# Patient Record
Sex: MALE | Race: BLACK OR AFRICAN AMERICAN | Employment: FULL TIME | URBAN - METROPOLITAN AREA
[De-identification: names, ages, dates, MRNs, and addresses within clinical notes are randomized per-mention and may not be internally consistent; named-entity substitution may affect disease eponyms.]

---

## 2019-11-08 ENCOUNTER — HOSPITAL ENCOUNTER (EMERGENCY)
Facility: HOSPITAL | Age: 39
Discharge: HOME | End: 2019-11-09
Attending: EMERGENCY MEDICINE
Payer: MEDICAID

## 2019-11-08 ENCOUNTER — HOSPITAL ENCOUNTER (EMERGENCY)
Facility: HOSPITAL | Age: 39
Discharge: HOME | End: 2019-11-08
Attending: EMERGENCY MEDICINE
Payer: MEDICAID

## 2019-11-08 ENCOUNTER — APPOINTMENT (OUTPATIENT)
Dept: GENERAL RADIOLOGY | Facility: HOSPITAL | Age: 39
End: 2019-11-08
Attending: EMERGENCY MEDICINE
Payer: MEDICAID

## 2019-11-08 VITALS
OXYGEN SATURATION: 100 % | RESPIRATION RATE: 16 BRPM | WEIGHT: 134 LBS | HEART RATE: 78 BPM | BODY MASS INDEX: 21.53 KG/M2 | SYSTOLIC BLOOD PRESSURE: 128 MMHG | HEIGHT: 66 IN | DIASTOLIC BLOOD PRESSURE: 73 MMHG | TEMPERATURE: 99 F

## 2019-11-08 DIAGNOSIS — Z79.01 SUBTHERAPEUTIC ANTICOAGULATION: ICD-10-CM

## 2019-11-08 DIAGNOSIS — R07.9 CHEST PAIN OF UNCERTAIN ETIOLOGY: Primary | ICD-10-CM

## 2019-11-08 DIAGNOSIS — R06.02 SHORTNESS OF BREATH: Primary | ICD-10-CM

## 2019-11-08 DIAGNOSIS — Z51.81 SUBTHERAPEUTIC ANTICOAGULATION: ICD-10-CM

## 2019-11-08 PROCEDURE — 93005 ELECTROCARDIOGRAM TRACING: CPT

## 2019-11-08 PROCEDURE — 99285 EMERGENCY DEPT VISIT HI MDM: CPT

## 2019-11-08 PROCEDURE — 71045 X-RAY EXAM CHEST 1 VIEW: CPT | Performed by: EMERGENCY MEDICINE

## 2019-11-08 PROCEDURE — 85025 COMPLETE CBC W/AUTO DIFF WBC: CPT | Performed by: EMERGENCY MEDICINE

## 2019-11-08 PROCEDURE — 36415 COLL VENOUS BLD VENIPUNCTURE: CPT

## 2019-11-08 PROCEDURE — 84484 ASSAY OF TROPONIN QUANT: CPT | Performed by: EMERGENCY MEDICINE

## 2019-11-08 PROCEDURE — 80048 BASIC METABOLIC PNL TOTAL CA: CPT | Performed by: EMERGENCY MEDICINE

## 2019-11-08 PROCEDURE — 93010 ELECTROCARDIOGRAM REPORT: CPT | Performed by: EMERGENCY MEDICINE

## 2019-11-08 PROCEDURE — 85610 PROTHROMBIN TIME: CPT | Performed by: EMERGENCY MEDICINE

## 2019-11-08 PROCEDURE — 99284 EMERGENCY DEPT VISIT MOD MDM: CPT

## 2019-11-08 RX ORDER — WARFARIN SODIUM 10 MG/1
10 TABLET ORAL DAILY
Status: ON HOLD | COMMUNITY
End: 2019-12-21

## 2019-11-08 RX ORDER — LEVETIRACETAM 500 MG/1
500 TABLET ORAL 2 TIMES DAILY
COMMUNITY

## 2019-11-08 NOTE — ED INITIAL ASSESSMENT (HPI)
Sob while at [employer]. Now states hes having cp. Wants to be admitted and brought all belongings with him.  Vitals wnl no distress noted

## 2019-11-09 VITALS
RESPIRATION RATE: 18 BRPM | DIASTOLIC BLOOD PRESSURE: 65 MMHG | HEART RATE: 74 BPM | SYSTOLIC BLOOD PRESSURE: 121 MMHG | OXYGEN SATURATION: 100 % | TEMPERATURE: 98 F | WEIGHT: 134.06 LBS | BODY MASS INDEX: 22 KG/M2

## 2019-11-09 NOTE — ED PROVIDER NOTES
Patient Seen in: [facility] AND [facility] Emergency Department    History   Patient presents with:  Dyspnea QUIN SOB (respiratory)      HPI    Patient presents to the ED after developing shortness of breath while at [facility].   He presents to the ED for this re Pulmonary/Chest: Effort normal. No stridor. No respiratory distress. He has no wheezes. Old sternotomy scar   Abdominal: Soft. He exhibits no distension. Musculoskeletal:         General: No deformity or edema.      Neurological: He is alert and oriente discharge summaries, testing, and procedures and reviewed those reports. Complicating Factors: The patient already has does not have a problem list on file. to contribute to the complexity of this ED evaluation.     ED Course: Patient presents complainin

## 2019-11-09 NOTE — CM/SW NOTE
Called by Dr. Sharon Tyler to arrange discharge transportation for patient as this is pt's second visit of the evening. Spoke with patient, offered homeless resources and patient declined stating he is staying with his cousin @ [address] [place] [address]. in [place].   CANDIDO

## 2019-11-09 NOTE — ED PROVIDER NOTES
Patient Seen in: [facility] AND [facility] Emergency Department    History   Patient presents with:  Chest Pain      HPI    Patient returns to the ED now stating that he has chest pain.   Was just seen earlier for shortness of breath and discharged home after req Normal rate and intact distal pulses. Exam reveals no friction rub. Pulmonary/Chest: Effort normal. No stridor. No respiratory distress. He has no wheezes. He has no rales. Old sternotomy scar   Abdominal: Soft. He exhibits no distension.  Musculoskelet (CST): Molly Simons MD on 11/08/2019 at 21:24     Approved by (CST): Molly Simons MD on 11/08/2019 at 21:25          ED Medications Administered: Medications - No data to display      [facility]      11/08/19  3169   BP: 128/74   Pulse: 76   Resp: 20 diagnosis)  Subtherapeutic anticoagulation    Disposition:  Discharge    Follow-up:  Carlton Osman MD  [address]  [phone]    Schedule an appointment as soon as possible for a visit in 3 days

## 2019-11-09 NOTE — ED INITIAL ASSESSMENT (HPI)
Pt recently dc'd from ER for CP. He states that all the tests came back good but he is still having CP and wants to be admitted.

## 2019-11-09 NOTE — ED NOTES
Pt states while sitting at [employer] he developed SOB and L sternum CP. Pt denies feeling SOB at this time, but reports sharp CP.

## 2019-11-16 ENCOUNTER — HOSPITAL ENCOUNTER (EMERGENCY)
Facility: HOSPITAL | Age: 39
Discharge: HOME | End: 2019-11-16
Attending: EMERGENCY MEDICINE
Payer: MEDICAID

## 2019-11-16 ENCOUNTER — APPOINTMENT (OUTPATIENT)
Dept: GENERAL RADIOLOGY | Facility: HOSPITAL | Age: 39
End: 2019-11-16
Attending: EMERGENCY MEDICINE
Payer: MEDICAID

## 2019-11-16 VITALS
WEIGHT: 135 LBS | TEMPERATURE: 98 F | SYSTOLIC BLOOD PRESSURE: 108 MMHG | RESPIRATION RATE: 16 BRPM | DIASTOLIC BLOOD PRESSURE: 59 MMHG | OXYGEN SATURATION: 97 % | BODY MASS INDEX: 22.49 KG/M2 | HEART RATE: 84 BPM | HEIGHT: 65 IN

## 2019-11-16 DIAGNOSIS — R00.2 PALPITATIONS: Primary | ICD-10-CM

## 2019-11-16 PROCEDURE — 84484 ASSAY OF TROPONIN QUANT: CPT | Performed by: EMERGENCY MEDICINE

## 2019-11-16 PROCEDURE — 80048 BASIC METABOLIC PNL TOTAL CA: CPT | Performed by: EMERGENCY MEDICINE

## 2019-11-16 PROCEDURE — 93010 ELECTROCARDIOGRAM REPORT: CPT | Performed by: EMERGENCY MEDICINE

## 2019-11-16 PROCEDURE — 71045 X-RAY EXAM CHEST 1 VIEW: CPT | Performed by: EMERGENCY MEDICINE

## 2019-11-16 PROCEDURE — 36415 COLL VENOUS BLD VENIPUNCTURE: CPT

## 2019-11-16 PROCEDURE — 99284 EMERGENCY DEPT VISIT MOD MDM: CPT

## 2019-11-16 PROCEDURE — 93005 ELECTROCARDIOGRAM TRACING: CPT

## 2019-11-16 PROCEDURE — 85025 COMPLETE CBC W/AUTO DIFF WBC: CPT | Performed by: EMERGENCY MEDICINE

## 2019-11-17 NOTE — CM/SW NOTE
Cm met with patient at bedside    Patient states \"I do not have any money to stay at the [employer].  I get paid next week\"    Cm gave resources   Judah Squires   LIBIA [facility], [address] [facility] in [address]

## 2019-11-17 NOTE — ED INITIAL ASSESSMENT (HPI)
Pt states he felt like he was about to have a seizure. Pt was shaking, states that happened with with his previous seizure. Last seizure august. Pt states he also felt like his ruiz was racing. Hx of a-fib. Pt also c/o chest pain starting few min pta.

## 2019-11-17 NOTE — ED PROVIDER NOTES
Patient Seen in: [facility] Emergency Department    History   Patient presents with:  Arrythmia/Palpitations (cardiovascular)      HPI    Patient returns to the ED stating that he felt like he was about to have a seizure tonight while sitting at [facility] normal. Right eye exhibits no discharge. Left eye exhibits no discharge. Neck: No tracheal deviation present. Cardiovascular: Normal rate and intact distal pulses. Audible mechanical heart sound   Pulmonary/Chest: Effort normal. No stridor.  No respir unchanged. Small right pleural effusion, unchanged.     Dictated by (CST): Panchito Duncan MD on 11/16/2019 at 19:58     Approved by (CST): Panchito Duncan MD on 11/16/2019 at 20:00          ED Medications Administered: Medications - No data to display      MDM Medication List as of 11/16/2019  8:39 PM

## 2019-11-17 NOTE — ED NOTES
Patient states he was at [facility] when he started to have palpations. Patient states \" I want to be admitted\" . No respiratory distress noted. Hx of AFIB.

## 2019-11-23 ENCOUNTER — APPOINTMENT (OUTPATIENT)
Dept: GENERAL RADIOLOGY | Facility: HOSPITAL | Age: 39
End: 2019-11-23
Attending: EMERGENCY MEDICINE

## 2019-11-23 ENCOUNTER — APPOINTMENT (OUTPATIENT)
Dept: CT IMAGING | Facility: HOSPITAL | Age: 39
End: 2019-11-23
Attending: EMERGENCY MEDICINE

## 2019-11-23 ENCOUNTER — HOSPITAL ENCOUNTER (EMERGENCY)
Facility: HOSPITAL | Age: 39
Discharge: HOME | End: 2019-11-23
Attending: EMERGENCY MEDICINE

## 2019-11-23 VITALS
DIASTOLIC BLOOD PRESSURE: 64 MMHG | TEMPERATURE: 98 F | OXYGEN SATURATION: 98 % | RESPIRATION RATE: 15 BRPM | HEART RATE: 78 BPM | SYSTOLIC BLOOD PRESSURE: 121 MMHG

## 2019-11-23 DIAGNOSIS — I48.91 ATRIAL FIBRILLATION WITH CONTROLLED VENTRICULAR RESPONSE (HCC): ICD-10-CM

## 2019-11-23 DIAGNOSIS — R06.00 DYSPNEA, UNSPECIFIED TYPE: Primary | ICD-10-CM

## 2019-11-23 DIAGNOSIS — I51.7 CARDIOMEGALY: ICD-10-CM

## 2019-11-23 PROCEDURE — 93010 ELECTROCARDIOGRAM REPORT: CPT | Performed by: EMERGENCY MEDICINE

## 2019-11-23 PROCEDURE — 85025 COMPLETE CBC W/AUTO DIFF WBC: CPT | Performed by: EMERGENCY MEDICINE

## 2019-11-23 PROCEDURE — 84484 ASSAY OF TROPONIN QUANT: CPT | Performed by: EMERGENCY MEDICINE

## 2019-11-23 PROCEDURE — 36415 COLL VENOUS BLD VENIPUNCTURE: CPT

## 2019-11-23 PROCEDURE — 71046 X-RAY EXAM CHEST 2 VIEWS: CPT | Performed by: EMERGENCY MEDICINE

## 2019-11-23 PROCEDURE — 71260 CT THORAX DX C+: CPT | Performed by: EMERGENCY MEDICINE

## 2019-11-23 PROCEDURE — 85379 FIBRIN DEGRADATION QUANT: CPT | Performed by: EMERGENCY MEDICINE

## 2019-11-23 PROCEDURE — 99285 EMERGENCY DEPT VISIT HI MDM: CPT

## 2019-11-23 PROCEDURE — 93005 ELECTROCARDIOGRAM TRACING: CPT

## 2019-11-23 PROCEDURE — 80048 BASIC METABOLIC PNL TOTAL CA: CPT | Performed by: EMERGENCY MEDICINE

## 2019-11-23 PROCEDURE — 85610 PROTHROMBIN TIME: CPT | Performed by: EMERGENCY MEDICINE

## 2019-11-23 RX ORDER — WARFARIN SODIUM 7.5 MG/1
7.5 TABLET ORAL NIGHTLY
Qty: 14 TABLET | Refills: 0 | Status: SHIPPED | OUTPATIENT
Start: 2019-11-23 | End: 2019-12-07

## 2019-11-23 NOTE — ED INITIAL ASSESSMENT (HPI)
Patient here for SOB over the last month. States he has had several episodes of chest pain that resolves on its own. About 30 minutes hours ago, he developed substernal CP without radiation.  Patient has been worked up for this several times in the past. Pa

## 2019-11-23 NOTE — CM/SW NOTE
Patient requesting a script for Coumadin, patient states he will not follow up with our Cards because he does not have any money upfront. [address] [facility] info will also be given for follow up- free to [facility].      Lea

## 2019-11-23 NOTE — ED NOTES
Pt BIBEMS d/t SOB and L sided dull, nonradiating, 9/10 cp beginning 10 min prior to calling paramedics. States has been intermittent x1-2 months. Denies n/v/d/f/c, no recent illiness, dizziness.   Has been seen multiple times for same issue in past.   PMH x

## 2019-11-23 NOTE — CM/SW NOTE
Met with patient regarding his Medicaid from [place]. [place] and works [employer] 5 to 6 days a week. Pt. Reports he has a cousin that lives here as well. He works at a [employer] and stays at [employer] in [place].

## 2019-11-23 NOTE — ED PROVIDER NOTES
Patient Seen in: [facility] AND [facility] Emergency Department    History   Patient presents with:  Dyspnea QUIN SOB (respiratory)    Stated Complaint: SOB    HPI    Patient presents again for difficulty breathing.   He said the symptoms on and off for over 2 mon pain      Positive for stated complaint: SOB  Other systems are as noted in HPI. Constitutional and vital signs reviewed. All other systems reviewed and negative except as noted above.       Physical Exam     ED Triage Vitals [11/23/19 1109]   / Coumadin. He states he is does not have that he initially stated that they had filled the prescription but now he says he does not have the medication.   We will give him a short course is listed as 10 mg a day which seems like a high dose we will do 7.5 w The following orders were created for panel order CBC WITH DIFFERENTIAL WITH PLATELET.   Procedure                               Abnormality         Status                     ---------                               -----------         ------

## 2019-12-20 ENCOUNTER — APPOINTMENT (OUTPATIENT)
Dept: CT IMAGING | Facility: HOSPITAL | Age: 39
End: 2019-12-20
Attending: EMERGENCY MEDICINE
Payer: MEDICAID

## 2019-12-20 ENCOUNTER — HOSPITAL ENCOUNTER (OUTPATIENT)
Facility: HOSPITAL | Age: 39
Setting detail: OBSERVATION
Discharge: HOME | End: 2019-12-21
Attending: EMERGENCY MEDICINE | Admitting: INTERNAL MEDICINE
Payer: MEDICAID

## 2019-12-20 ENCOUNTER — APPOINTMENT (OUTPATIENT)
Dept: GENERAL RADIOLOGY | Facility: HOSPITAL | Age: 39
End: 2019-12-20
Attending: EMERGENCY MEDICINE
Payer: MEDICAID

## 2019-12-20 DIAGNOSIS — R07.9 CHEST PAIN OF UNCERTAIN ETIOLOGY: Primary | ICD-10-CM

## 2019-12-20 DIAGNOSIS — R06.00 DYSPNEA, UNSPECIFIED TYPE: ICD-10-CM

## 2019-12-20 PROBLEM — R79.89 AZOTEMIA: Status: ACTIVE | Noted: 2019-12-20

## 2019-12-20 PROBLEM — D69.6 THROMBOCYTOPENIA (HCC): Status: ACTIVE | Noted: 2019-12-20

## 2019-12-20 PROBLEM — D64.9 ANEMIA: Status: ACTIVE | Noted: 2019-12-20

## 2019-12-20 PROCEDURE — 85610 PROTHROMBIN TIME: CPT | Performed by: EMERGENCY MEDICINE

## 2019-12-20 PROCEDURE — 85025 COMPLETE CBC W/AUTO DIFF WBC: CPT | Performed by: EMERGENCY MEDICINE

## 2019-12-20 PROCEDURE — 71260 CT THORAX DX C+: CPT | Performed by: EMERGENCY MEDICINE

## 2019-12-20 PROCEDURE — 83880 ASSAY OF NATRIURETIC PEPTIDE: CPT | Performed by: EMERGENCY MEDICINE

## 2019-12-20 PROCEDURE — 80048 BASIC METABOLIC PNL TOTAL CA: CPT | Performed by: EMERGENCY MEDICINE

## 2019-12-20 PROCEDURE — 96372 THER/PROPH/DIAG INJ SC/IM: CPT

## 2019-12-20 PROCEDURE — 85730 THROMBOPLASTIN TIME PARTIAL: CPT | Performed by: EMERGENCY MEDICINE

## 2019-12-20 PROCEDURE — 93010 ELECTROCARDIOGRAM REPORT: CPT | Performed by: EMERGENCY MEDICINE

## 2019-12-20 PROCEDURE — 93005 ELECTROCARDIOGRAM TRACING: CPT

## 2019-12-20 PROCEDURE — 71046 X-RAY EXAM CHEST 2 VIEWS: CPT | Performed by: EMERGENCY MEDICINE

## 2019-12-20 PROCEDURE — 84484 ASSAY OF TROPONIN QUANT: CPT | Performed by: EMERGENCY MEDICINE

## 2019-12-20 PROCEDURE — 99285 EMERGENCY DEPT VISIT HI MDM: CPT

## 2019-12-20 PROCEDURE — 36415 COLL VENOUS BLD VENIPUNCTURE: CPT

## 2019-12-20 RX ORDER — ENOXAPARIN SODIUM 100 MG/ML
60 INJECTION SUBCUTANEOUS ONCE
Status: COMPLETED | OUTPATIENT
Start: 2019-12-20 | End: 2019-12-20

## 2019-12-21 VITALS
HEART RATE: 71 BPM | DIASTOLIC BLOOD PRESSURE: 72 MMHG | WEIGHT: 134.69 LBS | OXYGEN SATURATION: 100 % | HEIGHT: 66 IN | RESPIRATION RATE: 18 BRPM | BODY MASS INDEX: 21.64 KG/M2 | SYSTOLIC BLOOD PRESSURE: 124 MMHG | TEMPERATURE: 98 F

## 2019-12-21 PROCEDURE — 85025 COMPLETE CBC W/AUTO DIFF WBC: CPT | Performed by: INTERNAL MEDICINE

## 2019-12-21 PROCEDURE — 85610 PROTHROMBIN TIME: CPT | Performed by: INTERNAL MEDICINE

## 2019-12-21 PROCEDURE — 80048 BASIC METABOLIC PNL TOTAL CA: CPT | Performed by: INTERNAL MEDICINE

## 2019-12-21 RX ORDER — WARFARIN SODIUM 2 MG/1
2 TABLET ORAL ONCE
Status: COMPLETED | OUTPATIENT
Start: 2019-12-21 | End: 2019-12-21

## 2019-12-21 RX ORDER — METOPROLOL SUCCINATE 25 MG/1
25 TABLET, EXTENDED RELEASE ORAL DAILY
COMMUNITY
Start: 2019-11-26 | End: 2019-12-26

## 2019-12-21 RX ORDER — WARFARIN SODIUM 10 MG/1
10 TABLET ORAL DAILY
Status: DISCONTINUED | OUTPATIENT
Start: 2019-12-21 | End: 2019-12-21

## 2019-12-21 RX ORDER — LEVETIRACETAM 500 MG/1
500 TABLET ORAL 2 TIMES DAILY
Status: DISCONTINUED | OUTPATIENT
Start: 2019-12-21 | End: 2019-12-21

## 2019-12-21 RX ORDER — ENOXAPARIN SODIUM 100 MG/ML
60 INJECTION SUBCUTANEOUS EVERY 12 HOURS SCHEDULED
Qty: 7 VIAL | Refills: 0 | Status: ON HOLD | OUTPATIENT
Start: 2019-12-21 | End: 2020-09-27

## 2019-12-21 RX ORDER — LISINOPRIL 5 MG/1
5 TABLET ORAL DAILY
COMMUNITY
Start: 2019-11-25

## 2019-12-21 RX ORDER — ENOXAPARIN SODIUM 100 MG/ML
60 INJECTION SUBCUTANEOUS EVERY 12 HOURS SCHEDULED
Status: DISCONTINUED | OUTPATIENT
Start: 2019-12-21 | End: 2019-12-21

## 2019-12-21 RX ORDER — WARFARIN SODIUM 6 MG/1
12 TABLET ORAL DAILY
Qty: 30 TABLET | Refills: 0 | Status: ON HOLD | OUTPATIENT
Start: 2019-12-22 | End: 2020-11-30

## 2019-12-21 RX ORDER — LISINOPRIL 5 MG/1
5 TABLET ORAL DAILY
Status: DISCONTINUED | OUTPATIENT
Start: 2019-12-21 | End: 2019-12-21

## 2019-12-21 RX ORDER — MORPHINE SULFATE 2 MG/ML
2 INJECTION, SOLUTION INTRAMUSCULAR; INTRAVENOUS EVERY 4 HOURS PRN
Status: DISCONTINUED | OUTPATIENT
Start: 2019-12-21 | End: 2019-12-21

## 2019-12-21 RX ORDER — ACETAMINOPHEN 325 MG/1
650 TABLET ORAL EVERY 6 HOURS PRN
Status: DISCONTINUED | OUTPATIENT
Start: 2019-12-21 | End: 2019-12-21

## 2019-12-21 RX ORDER — METOPROLOL SUCCINATE 25 MG/1
25 TABLET, EXTENDED RELEASE ORAL DAILY
Status: DISCONTINUED | OUTPATIENT
Start: 2019-12-21 | End: 2019-12-21

## 2019-12-21 NOTE — PLAN OF CARE
Patient c/o left sided chest pain 5/10 that he stated was improved since ER where he rated it as 8/10. MD was notified and morphine was ordered, however  the patient was sleep before morphine could be offered and slept well for the duration of the night.  P hemodynamic stability  - Monitor arterial and/or venous puncture sites for bleeding and/or hematoma  - Assess quality of pulses, skin color and temperature  - Assess for signs of decreased coronary artery perfusion - ex.  Angina  - Evaluate fluid balance, a

## 2019-12-21 NOTE — ED INITIAL ASSESSMENT (HPI)
Pt arrived per EMS from home. States pt c/o sob and lt side chest pain. EMS states O2 sat 100% and sitting helps with pain.

## 2019-12-21 NOTE — ED PROVIDER NOTES
Patient Seen in: [facility] AND [facility] Emergency Department      History   Patient presents with:  Dyspnea QUIN SOB  Chest Pain Angina    Stated Complaint: sob, Lt chest pain    HPI    Patient presents the emergency department today complaining of shortness o Normal range of motion and neck supple. Cardiovascular:      Rate and Rhythm: Normal rate and regular rhythm. Heart sounds: No murmur. Pulmonary:      Effort: Pulmonary effort is normal. No accessory muscle usage or respiratory distress.       [employer] DIFFERENTIAL - Abnormal; Notable for the following components:    WBC 3.8 (*)     RBC 3.54 (*)     HGB 10.9 (*)     HCT 35.5 (*)     .3 (*)     MCHC 30.7 (*)     .0 (*)     All other components within normal limits   TROPONIN I - Normal   TRO Rasta Diaz. Will admit.            Disposition and Plan     Clinical Impression:  Chest pain of uncertain etiology  (primary encounter diagnosis)  Dyspnea, unspecified type    Disposition:  Admit  12/20/2019 10:46 pm    Follow-up:   [facility]  1

## 2019-12-21 NOTE — DISCHARGE SUMMARY
[facility]    Discharge Summary    Lucita Michel Patient Status:  Observation    10/13/1980 MRN [id number]   Location [address] [facility] Attending No att. providers found   [facility] Day # 0 PCP None Pcp     Date of Admission:  None    Complications: None    Discharge Condition: Stable         Discharge Medications:      Discharge Medications      START taking these medications      Instructions Prescription details   Enoxaparin Sodium 60 MG/0.6ML Soln  Commonly known as:  Nae Shell

## 2019-12-21 NOTE — CM/SW NOTE
Lovenox [employer] coupon printed and tubed up to floor for discharge. Belen approved for discharge.

## 2019-12-21 NOTE — CONSULTS
[facility]    PATIENT'S NAME: FERNY Herring   ATTENDING PHYSICIAN: Cam Mckeon MD   CONSULTING PHYSICIAN: Saji Hansen DO   PATIENT ACCOUNT#:   [de-identified]    LOCATION:  [address] RECORD #:   [id number]       DATE OF JACQUES ruled out. 2.   Chronic atrial fibrillation. 3.   History of mechanical mitral and tricuspid valve for unclear reason. 4.   Subtherapeutic INR. RECOMMENDATIONS:  Warfarin was increased to 12 mg daily.   Other medications can be continued which are me

## 2019-12-21 NOTE — H&P
Lois Elkins 60 Patient Status:  Observation    10/13/1980 MRN [id number]   Location [facility] Attending Alfred Dwyer, *   Hosp Day # 0 PCP None Pcp     Date:  2019  Date of 124/72, pulse 71, temperature 98.4 °F (36.9 °C), temperature source Oral, resp. rate 18, height 5' 6\" (1.676 m), weight 134 lb 11.2 oz (61.1 kg), SpO2 100 %.      General appearance: alert, appears stated age and cooperative  Pulmonary:  clear to auscultat unchanged. Followup chest CT recommended in 6 months to demonstrate resolution. Borderline enlarged left axillary and right lower paratracheal lymph nodes are unchanged. This can also be assessed for resolution on subsequent follow-up imaging.    Bronchia

## 2019-12-22 ENCOUNTER — HOSPITAL ENCOUNTER (EMERGENCY)
Facility: HOSPITAL | Age: 39
Discharge: HOME | End: 2019-12-22
Attending: EMERGENCY MEDICINE
Payer: MEDICAID

## 2019-12-22 VITALS
RESPIRATION RATE: 17 BRPM | BODY MASS INDEX: 22 KG/M2 | DIASTOLIC BLOOD PRESSURE: 75 MMHG | WEIGHT: 134 LBS | OXYGEN SATURATION: 98 % | SYSTOLIC BLOOD PRESSURE: 109 MMHG | HEART RATE: 83 BPM | TEMPERATURE: 98 F

## 2019-12-22 DIAGNOSIS — R07.9 CHEST PAIN OF UNCERTAIN ETIOLOGY: Primary | ICD-10-CM

## 2019-12-22 PROCEDURE — 93010 ELECTROCARDIOGRAM REPORT: CPT | Performed by: EMERGENCY MEDICINE

## 2019-12-22 PROCEDURE — 99284 EMERGENCY DEPT VISIT MOD MDM: CPT

## 2019-12-22 PROCEDURE — 93005 ELECTROCARDIOGRAM TRACING: CPT

## 2019-12-23 NOTE — ED INITIAL ASSESSMENT (HPI)
Pt c/o chest pain that started x30 minutes prior to arrival. Has been seen here several times for CP, and CP still persists. C/o QUIN. Breathing is nonlabored in room, he is speaking in full and clear sentences, skin parameters are WDL.

## 2019-12-23 NOTE — ED PROVIDER NOTES
Patient Seen in: [facility] Emergency Department    History   Patient presents with:  Chest Pain Angina      HPI    The patient returns to the ED after being discharged yesterday after admission for chest pain.   He will not go into any detail abou Constitutional: He is oriented to person, place, and time. He appears well-developed and well-nourished. No distress. HENT:   Head: Normocephalic and atraumatic. Eyes: Conjunctivae are normal. Right eye exhibits no discharge.  Left eye exhibits no dis yesterday from an admission for the same. Multiple ER visits both here and at outside hospitals for the for the same over the past two months. No acute findings and patient with the same story today with no further complaints.   EKG stable, patient in no

## 2020-08-10 ENCOUNTER — HOSPITAL ENCOUNTER (EMERGENCY)
Facility: HOSPITAL | Age: 40
Discharge: HOME | End: 2020-08-11
Attending: EMERGENCY MEDICINE
Payer: COMMERCIAL

## 2020-08-10 ENCOUNTER — APPOINTMENT (OUTPATIENT)
Dept: GENERAL RADIOLOGY | Facility: HOSPITAL | Age: 40
End: 2020-08-10
Attending: EMERGENCY MEDICINE
Payer: COMMERCIAL

## 2020-08-10 DIAGNOSIS — R06.02 SOB (SHORTNESS OF BREATH): Primary | ICD-10-CM

## 2020-08-10 DIAGNOSIS — J81.1 CHRONIC PULMONARY EDEMA: ICD-10-CM

## 2020-08-10 LAB
ANION GAP SERPL CALC-SCNC: 6 MMOL/L (ref 0–18)
BASOPHILS # BLD AUTO: 0.01 X10(3) UL (ref 0–0.2)
BASOPHILS NFR BLD AUTO: 0.2 %
BUN BLD-MCNC: 16 MG/DL (ref 7–18)
BUN/CREAT SERPL: 21.9 (ref 10–20)
CALCIUM BLD-MCNC: 8.7 MG/DL (ref 8.5–10.1)
CHLORIDE SERPL-SCNC: 105 MMOL/L (ref 98–112)
CO2 SERPL-SCNC: 26 MMOL/L (ref 21–32)
CREAT BLD-MCNC: 0.73 MG/DL (ref 0.7–1.3)
DEPRECATED RDW RBC AUTO: 47.6 FL (ref 35.1–46.3)
EOSINOPHIL # BLD AUTO: 0.12 X10(3) UL (ref 0–0.7)
EOSINOPHIL NFR BLD AUTO: 2.9 %
ERYTHROCYTE [DISTWIDTH] IN BLOOD BY AUTOMATED COUNT: 12.7 % (ref 11–15)
GLUCOSE BLD-MCNC: 78 MG/DL (ref 70–99)
HCT VFR BLD AUTO: 36.7 % (ref 39–53)
HGB BLD-MCNC: 11.7 G/DL (ref 13–17.5)
IMM GRANULOCYTES # BLD AUTO: 0.01 X10(3) UL (ref 0–1)
IMM GRANULOCYTES NFR BLD: 0.2 %
LYMPHOCYTES # BLD AUTO: 1.14 X10(3) UL (ref 1–4)
LYMPHOCYTES NFR BLD AUTO: 27.5 %
MCH RBC QN AUTO: 32.4 PG (ref 26–34)
MCHC RBC AUTO-ENTMCNC: 31.9 G/DL (ref 31–37)
MCV RBC AUTO: 101.7 FL (ref 80–100)
MONOCYTES # BLD AUTO: 0.57 X10(3) UL (ref 0.1–1)
MONOCYTES NFR BLD AUTO: 13.8 %
NEUTROPHILS # BLD AUTO: 2.29 X10 (3) UL (ref 1.5–7.7)
NEUTROPHILS # BLD AUTO: 2.29 X10(3) UL (ref 1.5–7.7)
NEUTROPHILS NFR BLD AUTO: 55.4 %
NT-PROBNP SERPL-MCNC: 1378 PG/ML (ref ?–125)
OSMOLALITY SERPL CALC.SUM OF ELEC: 284 MOSM/KG (ref 275–295)
PLATELET # BLD AUTO: 161 10(3)UL (ref 150–450)
POTASSIUM SERPL-SCNC: 4 MMOL/L (ref 3.5–5.1)
RBC # BLD AUTO: 3.61 X10(6)UL (ref 4.3–5.7)
SODIUM SERPL-SCNC: 137 MMOL/L (ref 136–145)
TROPONIN I SERPL-MCNC: <0.045 NG/ML (ref ?–0.04)
WBC # BLD AUTO: 4.1 X10(3) UL (ref 4–11)

## 2020-08-10 PROCEDURE — 85730 THROMBOPLASTIN TIME PARTIAL: CPT | Performed by: EMERGENCY MEDICINE

## 2020-08-10 PROCEDURE — 84484 ASSAY OF TROPONIN QUANT: CPT | Performed by: EMERGENCY MEDICINE

## 2020-08-10 PROCEDURE — 83880 ASSAY OF NATRIURETIC PEPTIDE: CPT | Performed by: EMERGENCY MEDICINE

## 2020-08-10 PROCEDURE — 80048 BASIC METABOLIC PNL TOTAL CA: CPT | Performed by: EMERGENCY MEDICINE

## 2020-08-10 PROCEDURE — 93005 ELECTROCARDIOGRAM TRACING: CPT

## 2020-08-10 PROCEDURE — 96374 THER/PROPH/DIAG INJ IV PUSH: CPT

## 2020-08-10 PROCEDURE — 93010 ELECTROCARDIOGRAM REPORT: CPT | Performed by: EMERGENCY MEDICINE

## 2020-08-10 PROCEDURE — 71045 X-RAY EXAM CHEST 1 VIEW: CPT | Performed by: EMERGENCY MEDICINE

## 2020-08-10 PROCEDURE — 85610 PROTHROMBIN TIME: CPT | Performed by: EMERGENCY MEDICINE

## 2020-08-10 PROCEDURE — 99285 EMERGENCY DEPT VISIT HI MDM: CPT

## 2020-08-10 PROCEDURE — 85025 COMPLETE CBC W/AUTO DIFF WBC: CPT | Performed by: EMERGENCY MEDICINE

## 2020-08-11 VITALS
BODY MASS INDEX: 21.99 KG/M2 | TEMPERATURE: 99 F | HEART RATE: 84 BPM | RESPIRATION RATE: 21 BRPM | WEIGHT: 132 LBS | HEIGHT: 65 IN | DIASTOLIC BLOOD PRESSURE: 69 MMHG | SYSTOLIC BLOOD PRESSURE: 113 MMHG | OXYGEN SATURATION: 100 %

## 2020-08-11 LAB
APTT PPP: 40.3 SECONDS (ref 23.2–35.3)
INR BLD: 1.39 (ref 0.9–1.2)
PROTHROMBIN TIME: 16.8 SECONDS (ref 11.8–14.5)

## 2020-08-11 RX ORDER — FUROSEMIDE 10 MG/ML
40 INJECTION INTRAMUSCULAR; INTRAVENOUS ONCE
Status: COMPLETED | OUTPATIENT
Start: 2020-08-11 | End: 2020-08-11

## 2020-08-11 NOTE — ED PROVIDER NOTES
Patient Seen in: [facility] AND [facility] Emergency Department      History   Patient presents with:  Dyspnea QUIN SOB    Stated Complaint: sob    HPI    28-year-old male presents the ER with complaint of shortness of breath.   Patient with a past medical history Normal rate. Rhythm irregular. Pulses: Normal pulses. Heart sounds: Normal heart sounds. Pulmonary:      Effort: Pulmonary effort is normal.      Breath sounds: Normal breath sounds.    Abdominal:      General: Bowel sounds are normal.      Palp Fibrillation  Reading: No ST deviation, unchanged from previous EKG on December 29, 2019. Chest xray -  Chronic severe cardiomegaly. Mitral valve in place. Trace septal thickening, can be mild pulmonary edema. Small chronic right effusion.    No Medication List

## 2020-08-11 NOTE — ED INITIAL ASSESSMENT (HPI)
Pt to ed via ems for sob x30 min pta. Pt sts, \"I have a valve replacement. I became short of breath and called 911. \" Per ems, pt was found outside of a [place]. Pt is cool to touch. Pt sts, I was outside for an hour.  I was waiting for my aunt to pick me

## 2020-08-11 NOTE — CM/SW NOTE
Called by ELIAS Betts to arrange discharge transportation for patient - spoke with patient he would like to go to [address] [place] [address] in [facility] and his Ana Scheuermann will pick me up there because she wants to take me to breakfast and then she will take me back h

## 2020-08-11 NOTE — ED NOTES
Pt ambulatory out of ed via [address] [facility] in no distress speaking clear sentences.

## 2020-09-26 ENCOUNTER — HOSPITAL ENCOUNTER (INPATIENT)
Facility: HOSPITAL | Age: 40
LOS: 1 days | Discharge: HOME | DRG: 313 | End: 2020-09-28
Attending: EMERGENCY MEDICINE | Admitting: HOSPITALIST
Payer: COMMERCIAL

## 2020-09-26 ENCOUNTER — HOSPITAL ENCOUNTER (EMERGENCY)
Facility: HOSPITAL | Age: 40
Discharge: HOME | DRG: 313 | End: 2020-09-26
Attending: EMERGENCY MEDICINE
Payer: COMMERCIAL

## 2020-09-26 ENCOUNTER — APPOINTMENT (OUTPATIENT)
Dept: GENERAL RADIOLOGY | Facility: HOSPITAL | Age: 40
DRG: 313 | End: 2020-09-26
Payer: COMMERCIAL

## 2020-09-26 VITALS
WEIGHT: 135 LBS | BODY MASS INDEX: 22.49 KG/M2 | TEMPERATURE: 100 F | HEART RATE: 91 BPM | OXYGEN SATURATION: 98 % | SYSTOLIC BLOOD PRESSURE: 120 MMHG | RESPIRATION RATE: 22 BRPM | DIASTOLIC BLOOD PRESSURE: 71 MMHG | HEIGHT: 65 IN

## 2020-09-26 DIAGNOSIS — I48.20 ATRIAL FIBRILLATION, CHRONIC (HCC): Primary | ICD-10-CM

## 2020-09-26 DIAGNOSIS — R79.1 SUBTHERAPEUTIC INTERNATIONAL NORMALIZED RATIO (INR): ICD-10-CM

## 2020-09-26 DIAGNOSIS — R77.8 ELEVATED TROPONIN: Primary | ICD-10-CM

## 2020-09-26 DIAGNOSIS — R07.9 CHEST PAIN WITH HIGH RISK FOR CARDIAC ETIOLOGY: ICD-10-CM

## 2020-09-26 DIAGNOSIS — R07.9 CHEST PAIN OF UNCERTAIN ETIOLOGY: ICD-10-CM

## 2020-09-26 PROCEDURE — 80048 BASIC METABOLIC PNL TOTAL CA: CPT

## 2020-09-26 PROCEDURE — 80048 BASIC METABOLIC PNL TOTAL CA: CPT | Performed by: EMERGENCY MEDICINE

## 2020-09-26 PROCEDURE — 85025 COMPLETE CBC W/AUTO DIFF WBC: CPT | Performed by: EMERGENCY MEDICINE

## 2020-09-26 PROCEDURE — 36415 COLL VENOUS BLD VENIPUNCTURE: CPT

## 2020-09-26 PROCEDURE — 85610 PROTHROMBIN TIME: CPT | Performed by: EMERGENCY MEDICINE

## 2020-09-26 PROCEDURE — 84484 ASSAY OF TROPONIN QUANT: CPT

## 2020-09-26 PROCEDURE — 85025 COMPLETE CBC W/AUTO DIFF WBC: CPT

## 2020-09-26 PROCEDURE — 71045 X-RAY EXAM CHEST 1 VIEW: CPT | Performed by: EMERGENCY MEDICINE

## 2020-09-26 PROCEDURE — 93010 ELECTROCARDIOGRAM REPORT: CPT | Performed by: EMERGENCY MEDICINE

## 2020-09-26 PROCEDURE — 84484 ASSAY OF TROPONIN QUANT: CPT | Performed by: EMERGENCY MEDICINE

## 2020-09-26 PROCEDURE — 80061 LIPID PANEL: CPT | Performed by: EMERGENCY MEDICINE

## 2020-09-26 PROCEDURE — 93005 ELECTROCARDIOGRAM TRACING: CPT

## 2020-09-26 PROCEDURE — 99284 EMERGENCY DEPT VISIT MOD MDM: CPT

## 2020-09-26 RX ORDER — METOPROLOL TARTRATE 5 MG/5ML
5 INJECTION INTRAVENOUS ONCE
Status: DISCONTINUED | OUTPATIENT
Start: 2020-09-26 | End: 2020-09-26

## 2020-09-26 RX ORDER — FUROSEMIDE 20 MG/1
20 TABLET ORAL 2 TIMES DAILY
COMMUNITY
Start: 2020-09-19 | End: 2020-10-19

## 2020-09-26 RX ORDER — ACETAMINOPHEN 500 MG
1000 TABLET ORAL ONCE
Status: COMPLETED | OUTPATIENT
Start: 2020-09-26 | End: 2020-09-26

## 2020-09-26 RX ORDER — METOPROLOL SUCCINATE 100 MG/1
100 TABLET, EXTENDED RELEASE ORAL DAILY
COMMUNITY
Start: 2020-09-19

## 2020-09-26 RX ORDER — MAGNESIUM HYDROXIDE/ALUMINUM HYDROXICE/SIMETHICONE 120; 1200; 1200 MG/30ML; MG/30ML; MG/30ML
30 SUSPENSION ORAL ONCE
Status: DISCONTINUED | OUTPATIENT
Start: 2020-09-26 | End: 2020-09-26

## 2020-09-26 RX ORDER — FERROUS SULFATE 325(65) MG
325 TABLET ORAL 3 TIMES DAILY
COMMUNITY

## 2020-09-26 RX ORDER — NITROGLYCERIN 0.4 MG/1
0.4 TABLET SUBLINGUAL ONCE
Status: COMPLETED | OUTPATIENT
Start: 2020-09-26 | End: 2020-09-26

## 2020-09-26 RX ORDER — FAMOTIDINE 10 MG/ML
20 INJECTION, SOLUTION INTRAVENOUS ONCE
Status: COMPLETED | OUTPATIENT
Start: 2020-09-26 | End: 2020-09-26

## 2020-09-27 PROBLEM — R07.9 CHEST PAIN WITH HIGH RISK FOR CARDIAC ETIOLOGY: Status: ACTIVE | Noted: 2020-09-27

## 2020-09-27 PROBLEM — R77.8 ELEVATED TROPONIN: Status: ACTIVE | Noted: 2020-09-27

## 2020-09-27 PROCEDURE — 99222 1ST HOSP IP/OBS MODERATE 55: CPT | Performed by: HOSPITALIST

## 2020-09-27 RX ORDER — HEPARIN SODIUM AND DEXTROSE 10000; 5 [USP'U]/100ML; G/100ML
INJECTION INTRAVENOUS CONTINUOUS
Status: DISCONTINUED | OUTPATIENT
Start: 2020-09-27 | End: 2020-09-27

## 2020-09-27 RX ORDER — WARFARIN SODIUM 5 MG/1
10 TABLET ORAL NIGHTLY
Status: DISCONTINUED | OUTPATIENT
Start: 2020-09-27 | End: 2020-09-28

## 2020-09-27 RX ORDER — ASPIRIN 81 MG/1
81 TABLET, CHEWABLE ORAL DAILY
Status: DISCONTINUED | OUTPATIENT
Start: 2020-09-27 | End: 2020-09-28

## 2020-09-27 RX ORDER — LISINOPRIL 5 MG/1
5 TABLET ORAL DAILY
Status: DISCONTINUED | OUTPATIENT
Start: 2020-09-27 | End: 2020-09-28

## 2020-09-27 RX ORDER — ACETAMINOPHEN 325 MG/1
650 TABLET ORAL EVERY 6 HOURS PRN
Status: DISCONTINUED | OUTPATIENT
Start: 2020-09-27 | End: 2020-09-28

## 2020-09-27 RX ORDER — HEPARIN SODIUM 1000 [USP'U]/ML
60 INJECTION, SOLUTION INTRAVENOUS; SUBCUTANEOUS ONCE
Status: COMPLETED | OUTPATIENT
Start: 2020-09-27 | End: 2020-09-27

## 2020-09-27 RX ORDER — ONDANSETRON 2 MG/ML
4 INJECTION INTRAMUSCULAR; INTRAVENOUS EVERY 6 HOURS PRN
Status: DISCONTINUED | OUTPATIENT
Start: 2020-09-27 | End: 2020-09-28

## 2020-09-27 RX ORDER — MORPHINE SULFATE 4 MG/ML
4 INJECTION, SOLUTION INTRAMUSCULAR; INTRAVENOUS EVERY 2 HOUR PRN
Status: DISCONTINUED | OUTPATIENT
Start: 2020-09-27 | End: 2020-09-28

## 2020-09-27 RX ORDER — MELATONIN
325 3 TIMES DAILY
Status: DISCONTINUED | OUTPATIENT
Start: 2020-09-27 | End: 2020-09-28

## 2020-09-27 RX ORDER — HYDRALAZINE HYDROCHLORIDE 20 MG/ML
10 INJECTION INTRAMUSCULAR; INTRAVENOUS EVERY 4 HOURS PRN
Status: DISCONTINUED | OUTPATIENT
Start: 2020-09-27 | End: 2020-09-28

## 2020-09-27 RX ORDER — HYDROCODONE BITARTRATE AND ACETAMINOPHEN 5; 325 MG/1; MG/1
1 TABLET ORAL EVERY 6 HOURS PRN
Status: DISCONTINUED | OUTPATIENT
Start: 2020-09-27 | End: 2020-09-28

## 2020-09-27 RX ORDER — MORPHINE SULFATE 2 MG/ML
2 INJECTION, SOLUTION INTRAMUSCULAR; INTRAVENOUS EVERY 2 HOUR PRN
Status: DISCONTINUED | OUTPATIENT
Start: 2020-09-27 | End: 2020-09-28

## 2020-09-27 RX ORDER — HEPARIN SODIUM AND DEXTROSE 10000; 5 [USP'U]/100ML; G/100ML
12 INJECTION INTRAVENOUS ONCE
Status: DISCONTINUED | OUTPATIENT
Start: 2020-09-27 | End: 2020-09-27

## 2020-09-27 RX ORDER — ATORVASTATIN CALCIUM 40 MG/1
40 TABLET, FILM COATED ORAL NIGHTLY
Status: DISCONTINUED | OUTPATIENT
Start: 2020-09-27 | End: 2020-09-28

## 2020-09-27 RX ORDER — ZOLPIDEM TARTRATE 5 MG/1
5 TABLET ORAL NIGHTLY PRN
Status: DISCONTINUED | OUTPATIENT
Start: 2020-09-27 | End: 2020-09-28

## 2020-09-27 RX ORDER — LEVETIRACETAM 500 MG/1
500 TABLET ORAL 2 TIMES DAILY
Status: DISCONTINUED | OUTPATIENT
Start: 2020-09-27 | End: 2020-09-28

## 2020-09-27 RX ORDER — HEPARIN SODIUM AND DEXTROSE 10000; 5 [USP'U]/100ML; G/100ML
INJECTION INTRAVENOUS CONTINUOUS
Status: DISCONTINUED | OUTPATIENT
Start: 2020-09-27 | End: 2020-09-28

## 2020-09-27 RX ORDER — POTASSIUM CHLORIDE 20 MEQ/1
40 TABLET, EXTENDED RELEASE ORAL ONCE
Status: COMPLETED | OUTPATIENT
Start: 2020-09-27 | End: 2020-09-27

## 2020-09-27 RX ORDER — METOPROLOL SUCCINATE 100 MG/1
100 TABLET, EXTENDED RELEASE ORAL DAILY
Status: DISCONTINUED | OUTPATIENT
Start: 2020-09-27 | End: 2020-09-28

## 2020-09-27 NOTE — CM/SW NOTE
Spoke with Pt regarding his request to transfer to [place]. He states his only reason for wanting to go there is \"because that's where I always go\".  Explained that transfer costs may be billed to him due to the lateral transfer of care, Pt states \"I harmony

## 2020-09-27 NOTE — CONSULTS
[facility]    Report of Consultation    Governor Jose Daniel Patient Status:  Inpatient    10/13/1980 MRN [id number]   Location [facility] 3W/SW Attending Grabiel Mitchell MD   Hosp Day # 0 PCP None Pcp     Date of Admission:  2020  Da (NORCO) 5-325 MG per tab 1 tablet, 1 tablet, Oral, Q6H PRN    •  hydrALAzine HCl (APRESOLINE) injection 10 mg, 10 mg, Intravenous, Q4H PRN    •  zolpidem (AMBIEN) tab 5 mg, 5 mg, Oral, Nightly PRN    •  heparin (PORCINE) drip 32995gvfts/250mL infusion CONT normal, no murmur, click, rub or gallop, regular rate and rhythm  Abdominal: soft, non-tender; bowel sounds normal; no masses,  no organomegaly  Extremities: extremities normal, atraumatic, no cyanosis or edema    Results:     Laboratory Data:  Lab Results Normal function of prosthetic mitral valve. Aortic Valve:      Normal aortic valve leaflets. No aortic stenosis. No   aortic regurgitation. Tricuspid Valve:   Normal tricuspid valve leaflets. No tricuspid stenosis.     Mild tricuspid regurgitati

## 2020-09-27 NOTE — ED PROVIDER NOTES
Patient Seen in: [facility] AND [facility] Emergency Department      History   Patient presents with:  Chest Pain Angina    Stated Complaint: cp    HPI    43-year-old male with history of Atrial fibrillation, on Coumadin, recently seen in the emergency departmen nursing note reviewed. HENT:      Head: Normocephalic. Mouth/Throat:      Mouth: Mucous membranes are moist.   Eyes:      Extraocular Movements: Extraocular movements intact. Neck:      Musculoskeletal: Neck supple.    Cardiovascular:      Rate and 6 0 - 18 mmol/L    BUN 25 (H) 7 - 18 mg/dL    Creatinine 1.28 0.70 - 1.30 mg/dL    BUN/CREA Ratio 19.5 10.0 - 20.0    Calcium, Total 8.5 8.5 - 10.1 mg/dL    Calculated Osmolality 298 (H) 275 - 295 mOsm/kg    GFR, Non- 70 >=60    GFR, [place] [facility] Results Available and Reviewed by me while in ED:  Xr Chest Ap Portable  (cpt=71045)    Result Date: 9/26/2020  CONCLUSION:  1. Stable marked cardiomegaly in this patient post mitral valve replacement. 2. No acute pulmonary process.     Dictat Plan     Clinical Impression:  Elevated troponin  (primary encounter diagnosis)  Chest pain with high risk for cardiac etiology    Disposition:  Admit  9/27/2020  1:05 am    Follow-up:  No follow-up provider specified.         Medications Prescribed:  Uday Rich

## 2020-09-27 NOTE — ED NOTES
Pt a/ox4, respirations unlabored, speech full/clear, NAD. All ED orders completed. Taken by transport at this time w/ all belongings.

## 2020-09-27 NOTE — ED NOTES
Orders for admission, patient is aware of plan and ready to go upstairs. Any questions, please call ED RN Chucky [place]  at extension [phone].    Type of COVID test sent: n/a  COVID Suspicion level: low  Drug(s) infusing: Heparin  LOC at time of transport: a/ox4

## 2020-09-27 NOTE — CM/SW NOTE
Pt requesting transfer to [facility] for continued chest pain. Called the transfer center at [facility]. Information given to [facility] in transfer center. Facesheet faxed to [phone]. [facility] will call after insurance is verified.  Beds at [facility] are [facility] t

## 2020-09-27 NOTE — H&P
Murphyrobinsonjim Severo 60 Patient Status:  Inpatient    10/13/1980 MRN [id number]   Location [facility] 3W/SW Attending Deysi Lopez MD   Hosp Day # 0 PCP None Pcp     Date:  2020  Date of Admission: at Unknown time  Yes Yes   Sig: Take 20 mg by mouth 2 (two) times daily. levETIRAcetam 500 MG Oral Tab 9/27/2020 at Unknown time  Yes Yes   Sig: Take 500 mg by mouth 2 (two) times daily.    lisinopril 5 MG Oral Tab 9/27/2020 at Unknown time  Yes Yes   S Component Value Date    WBC 4.1 09/27/2020    HGB 9.8 09/27/2020    HCT 30.6 09/27/2020    .0 09/27/2020    PTT 33.7 09/27/2020       Imaging:  Xr Chest Ap Portable  (cpt=71045)    Result Date: 9/26/2020  CONCLUSION:  1. Stable marked cardiomegaly

## 2020-09-27 NOTE — ED INITIAL ASSESSMENT (HPI)
Patient was d/c within the last hour after CP workup, sts he was d/c with negatiive workup and was waiting for his [place] but was still having chest pain so he called EMS and came back to ED. Denies any new complaints besides CP. No further complaints.  A/

## 2020-09-27 NOTE — PLAN OF CARE
VSS. Pt was comfortably sleeping most of the day. Heparin gtt infusing as per orders. Pt reports pain is improving, almost resolved. Plan- echo in am, will continue to monitor pt.     Problem: Patient Centered Care  Goal: Patient preferences are identified Initiate emergency measures for life threatening arrhythmias  - Monitor electrolytes and administer replacement therapy as ordered  Outcome: Progressing     Problem: PAIN - ADULT  Goal: Verbalizes/displays adequate comfort level or patient's stated pain go to assist at discharge as needed  - Consider post-discharge preferences of patient/family/discharge partner  - Complete POLST form as appropriate  - Assess patient's ability to be responsible for managing their own health  - Refer to Case Management Depart

## 2020-09-27 NOTE — PLAN OF CARE
Heparin drip continues. Denies chest pain. Will continue to monitor.     Problem: Patient Centered Care  Goal: Patient preferences are identified and integrated in the patient's plan of care  Description: Interventions:  - What would you like us to know as Assess pain using appropriate pain scale  - Administer analgesics based on type and severity of pain and evaluate response  - Implement non-pharmacological measures as appropriate and evaluate response  - Consider cultural and social influences on pain and on physician/LIP order or complex needs related to functional status, cognitive ability or social support system  Outcome: Progressing

## 2020-09-27 NOTE — ED INITIAL ASSESSMENT (HPI)
Patient reports 9/10 sharp non radiating chest pain beginning tonight while he was at a bus stop. Patient reports hx of afib, found to be in afib by EMS. Patient given full dose of asa by EMS.  Patient reports that he developed the chest pain after eating M

## 2020-09-27 NOTE — ED PROVIDER NOTES
Patient Seen in: [facility] AND [facility] Emergency Department    History   Patient presents with:  Chest Pain Angina      HPI    Patient with a history of congenital heart disease requiring surgery as a child, mitral valve replacement in 2013 on chronic Coumad prevent infection transmission during my interaction with the patient were taken. The patient was already wearing a droplet mask on my arrival to the room.  Personal protective equipment including droplet mask, eye protection, and gloves were worn throughou Abnormal; Notable for the following components:    RBC 2.99 (*)     HGB 9.8 (*)     HCT 30.0 (*)     .3 (*)     Lymphocyte Absolute 0.85 (*)     All other components within normal limits   LIPID PANEL - Normal   CBC WITH DIFFERENTIAL WITH PLATELET [facility]      09/26/20 1929 09/26/20 2000 09/26/20 2100 09/26/20 2200   BP: 115/72 124/77 116/71 120/71   Pulse: 102 113 109 91   Resp: 22 (!) 31 21 22   Temp: 99.5 °F (37.5 °C)      TempSrc: Temporal      SpO2: 98% 97% 98%    Weight: 61.2 kg      Rosa a visit in 3 days        Medications Prescribed:  Discharge Medication List as of 9/26/2020 10:18 PM

## 2020-09-28 ENCOUNTER — APPOINTMENT (OUTPATIENT)
Dept: CV DIAGNOSTICS | Facility: HOSPITAL | Age: 40
DRG: 313 | End: 2020-09-28
Attending: HOSPITALIST
Payer: COMMERCIAL

## 2020-09-28 VITALS
SYSTOLIC BLOOD PRESSURE: 115 MMHG | HEIGHT: 65 IN | DIASTOLIC BLOOD PRESSURE: 66 MMHG | WEIGHT: 138.81 LBS | BODY MASS INDEX: 23.13 KG/M2 | HEART RATE: 78 BPM | TEMPERATURE: 98 F | RESPIRATION RATE: 18 BRPM | OXYGEN SATURATION: 100 %

## 2020-09-28 PROCEDURE — 93306 TTE W/DOPPLER COMPLETE: CPT | Performed by: HOSPITALIST

## 2020-09-28 PROCEDURE — 99239 HOSP IP/OBS DSCHRG MGMT >30: CPT | Performed by: HOSPITALIST

## 2020-09-28 NOTE — DISCHARGE SUMMARY
[facility]  Discharge Summary     Jimi Reyna  : 10/13/1980    Status: Inpatient  Day #: 1    Attending: Néstor Reyes MD  PCP: None Pcp     Date of Admission: 2020  Date of Discharge: 2020    Patient leaving against medical a unlabored  Gastrointestinal:  Soft, non-tender, normal bowel sounds  Musculoskeletal:  No joint swelling  Extremities:  No edema, no cyanosis, no clubbing  Neurologic:  nonfocal  Psychiatric:  Normal affect, calm and appropriate      Hospital Discharge Tricia

## 2020-09-28 NOTE — PLAN OF CARE
Problem: CARDIOVASCULAR - ADULT  Goal: Maintains optimal cardiac output and hemodynamic stability  Description: INTERVENTIONS:  - Monitor vital signs, rhythm, and trends  - Monitor for bleeding, hypotension and signs of decreased cardiac output  - Evalua

## 2020-09-30 NOTE — PAYOR COMM NOTE
--------------  ADMISSION REVIEW     Alton Coulter #:  [id number]  Authorization Number: PENDING    Admit date: 9/27/20  Admit time: 8840       Admitting Physician: Vandana Gonzales MD  Attending Physician:  No att. providers found  Primary above.    Physical Exam     ED Triage Vitals [09/26/20 2305]   /67   Pulse 95   Resp 20   Temp 98.7 °F (37.1 °C)   Temp src Oral   SpO2 98 %   O2 Device None (Room air)     Current:/72   Pulse 70   Temp 98.7 °F (37.1 °C) (Oral)   Resp 16   Ht 1 8.5 - 10.1 mg/dL    Calculated Osmolality 298 (H) 275 - 295 mOsm/kg    GFR, Non- 70 >=60    GFR, -American 81 >=60   TROPONIN I    Collection Time: 09/26/20  7:28 PM   Result Value Ref Range    Troponin 0.078 (HH) <0.045 ng/mL   CBC marked cardiomegaly in this patient post mitral valve replacement. 2. No acute pulmonary process. EMERGENCY DEPARTMENT COURSE AND TREATMENT:  Patient's condition was stable during Emergency Department evaluation.      39yoM with chest pain  - I persona with high risk for cardiac etiology R07.9 9/27/2020      Viral Guevara MD on 9/27/2020  1:50 AM         H&P signed by Adithya Hall MD at 9/27/2020  8:59 PM     Author: Adithya Hall MD Service: Hospitalist Author Type: Physician    Filed: 9/27/2020 (36.4 °C)  Pulse:  [] 80  Resp:  [16-31] 16  BP: (102-124)/(63-78) 114/73    General: Somnolent but arousable  Diffuse skin problem:  None. Eye:  Pupils are equal, round and reactive to light, extraocular movements are intact, Normal conjunctiva.   H anticoagulation will resume Coumadin, when okay with cardiology    Prophylaxis  Intravenous heparin drip    CODE STATUS  Full    Primary care physician  None Pcp    Disposition  Clinical course will dictate outcome  Aletha Carrington MD  9/27/2020  7:40 AM importance of therapeutic INR and compliance with Coumadin as it increases risk of stroke however patient insisted he must leave. RN at 9/28/2020 10:15 AM    Pt is A/O x 4, denies pain, denies SOB. Leaving AMA.  Went over risks and importance of Coumadin; OR  Start: 09/27/20 0930 End: 09/28/20 1449 0930-Given          0827-Given   1449-D/C'd        Metoprolol Succinate ER (Toprol XL) 24 hr tab 100 mg   Dose: 100 mg  Freq: Daily Route: OR  Start: 09/27/20 0930 End: 09/28/20 1449 0930-Given          0

## 2020-10-01 NOTE — PAYOR COMM NOTE
--------------  DISCHARGE REVIEW    Viniciomadeleine Elana #:  [id number]  Authorization Number: PENDING    Admit date: 9/27/20  Admit time:  0148  Discharge Date: 9/28/2020 12:49 PM     Admitting Physician: Gerry Campos MD  Attending Physician: advised to stay until his INR was in the 2.5 - 3.5 range, but he wants to leave against medical advice.       Consultants     Provider Role Specialty    Wilkins Boast, MD Consulting Physician  Cardiovascular Diseases           Physical Exam   Blood pressure

## 2020-11-10 ENCOUNTER — HOSPITAL ENCOUNTER (EMERGENCY)
Facility: HOSPITAL | Age: 40
Discharge: HOME | End: 2020-11-10
Attending: EMERGENCY MEDICINE
Payer: COMMERCIAL

## 2020-11-10 VITALS
RESPIRATION RATE: 18 BRPM | HEART RATE: 86 BPM | DIASTOLIC BLOOD PRESSURE: 61 MMHG | TEMPERATURE: 98 F | OXYGEN SATURATION: 98 % | SYSTOLIC BLOOD PRESSURE: 113 MMHG

## 2020-11-10 DIAGNOSIS — R07.9 CHEST PAIN OF UNCERTAIN ETIOLOGY: Primary | ICD-10-CM

## 2020-11-10 PROCEDURE — 84484 ASSAY OF TROPONIN QUANT: CPT | Performed by: EMERGENCY MEDICINE

## 2020-11-10 PROCEDURE — 36415 COLL VENOUS BLD VENIPUNCTURE: CPT

## 2020-11-10 PROCEDURE — 93005 ELECTROCARDIOGRAM TRACING: CPT

## 2020-11-10 PROCEDURE — 93010 ELECTROCARDIOGRAM REPORT: CPT | Performed by: EMERGENCY MEDICINE

## 2020-11-10 PROCEDURE — 85025 COMPLETE CBC W/AUTO DIFF WBC: CPT | Performed by: EMERGENCY MEDICINE

## 2020-11-10 PROCEDURE — 99284 EMERGENCY DEPT VISIT MOD MDM: CPT

## 2020-11-10 PROCEDURE — 80048 BASIC METABOLIC PNL TOTAL CA: CPT | Performed by: EMERGENCY MEDICINE

## 2020-11-11 NOTE — ED PROVIDER NOTES
Patient Seen in: [facility] AND [facility] Emergency Department    History   Patient presents with:  Chest Pain Angina      HPI    Patient with a history of congenital heart disease requiring surgery as a child, mitral valve replacement in 2013 on chronic Coumad patient were taken. The patient was already wearing a droplet mask on my arrival to the room.  Personal protective equipment including droplet mask, eye protection, and gloves were worn throughout the duration of the exam.  Handwashing was performed prior t DIFFERENTIAL[[id number]]          Abnormal            Final result                                                 Please view results for these tests on the individual orders.    RAINBOW DRAW BLUE   RAINBOW DRAW LAVENDER   RAINBOW DRAW LIGHT GREEN   RAINBOW Gianna [address] [facility]    Schedule an appointment as soon as possible for a visit in 1 week        Medications Prescribed:  Discharge Medication List as of 11/10/2020  7:24 PM

## 2020-11-26 ENCOUNTER — APPOINTMENT (OUTPATIENT)
Dept: GENERAL RADIOLOGY | Facility: HOSPITAL | Age: 40
End: 2020-11-26
Payer: COMMERCIAL

## 2020-11-26 ENCOUNTER — HOSPITAL ENCOUNTER (OUTPATIENT)
Facility: HOSPITAL | Age: 40
Setting detail: OBSERVATION
Discharge: HOME | End: 2020-11-30
Attending: EMERGENCY MEDICINE | Admitting: HOSPITALIST
Payer: COMMERCIAL

## 2020-11-26 ENCOUNTER — APPOINTMENT (OUTPATIENT)
Dept: CT IMAGING | Facility: HOSPITAL | Age: 40
End: 2020-11-26
Attending: EMERGENCY MEDICINE
Payer: COMMERCIAL

## 2020-11-26 DIAGNOSIS — Z79.01 SUBTHERAPEUTIC ANTICOAGULATION: ICD-10-CM

## 2020-11-26 DIAGNOSIS — Z95.2 STATUS POST AORTIC VALVE AND MITRAL VALVE REPLACEMENT: ICD-10-CM

## 2020-11-26 DIAGNOSIS — R07.9 CHEST PAIN OF UNCERTAIN ETIOLOGY: Primary | ICD-10-CM

## 2020-11-26 DIAGNOSIS — I48.91 ATRIAL FIBRILLATION AND FLUTTER (HCC): ICD-10-CM

## 2020-11-26 DIAGNOSIS — I48.92 ATRIAL FIBRILLATION AND FLUTTER (HCC): ICD-10-CM

## 2020-11-26 DIAGNOSIS — R06.00 DYSPNEA, UNSPECIFIED TYPE: ICD-10-CM

## 2020-11-26 DIAGNOSIS — Z51.81 SUBTHERAPEUTIC ANTICOAGULATION: ICD-10-CM

## 2020-11-26 PROCEDURE — 80048 BASIC METABOLIC PNL TOTAL CA: CPT | Performed by: EMERGENCY MEDICINE

## 2020-11-26 PROCEDURE — 84484 ASSAY OF TROPONIN QUANT: CPT | Performed by: HOSPITALIST

## 2020-11-26 PROCEDURE — 83880 ASSAY OF NATRIURETIC PEPTIDE: CPT | Performed by: EMERGENCY MEDICINE

## 2020-11-26 PROCEDURE — 85730 THROMBOPLASTIN TIME PARTIAL: CPT | Performed by: INTERNAL MEDICINE

## 2020-11-26 PROCEDURE — 85730 THROMBOPLASTIN TIME PARTIAL: CPT | Performed by: EMERGENCY MEDICINE

## 2020-11-26 PROCEDURE — 85610 PROTHROMBIN TIME: CPT | Performed by: EMERGENCY MEDICINE

## 2020-11-26 PROCEDURE — 93005 ELECTROCARDIOGRAM TRACING: CPT

## 2020-11-26 PROCEDURE — 85025 COMPLETE CBC W/AUTO DIFF WBC: CPT | Performed by: EMERGENCY MEDICINE

## 2020-11-26 PROCEDURE — 71260 CT THORAX DX C+: CPT | Performed by: EMERGENCY MEDICINE

## 2020-11-26 PROCEDURE — 99285 EMERGENCY DEPT VISIT HI MDM: CPT

## 2020-11-26 PROCEDURE — 93010 ELECTROCARDIOGRAM REPORT: CPT | Performed by: EMERGENCY MEDICINE

## 2020-11-26 PROCEDURE — 71045 X-RAY EXAM CHEST 1 VIEW: CPT

## 2020-11-26 PROCEDURE — 84484 ASSAY OF TROPONIN QUANT: CPT | Performed by: EMERGENCY MEDICINE

## 2020-11-26 PROCEDURE — 96365 THER/PROPH/DIAG IV INF INIT: CPT

## 2020-11-26 RX ORDER — ONDANSETRON 2 MG/ML
4 INJECTION INTRAMUSCULAR; INTRAVENOUS EVERY 6 HOURS PRN
Status: DISCONTINUED | OUTPATIENT
Start: 2020-11-26 | End: 2020-11-30

## 2020-11-26 RX ORDER — SODIUM PHOSPHATE, DIBASIC AND SODIUM PHOSPHATE, MONOBASIC 7; 19 G/133ML; G/133ML
1 ENEMA RECTAL ONCE AS NEEDED
Status: DISCONTINUED | OUTPATIENT
Start: 2020-11-26 | End: 2020-11-30

## 2020-11-26 RX ORDER — HEPARIN SODIUM AND DEXTROSE 10000; 5 [USP'U]/100ML; G/100ML
INJECTION INTRAVENOUS CONTINUOUS
Status: DISCONTINUED | OUTPATIENT
Start: 2020-11-26 | End: 2020-11-26

## 2020-11-26 RX ORDER — ACETAMINOPHEN 325 MG/1
650 TABLET ORAL EVERY 6 HOURS PRN
Status: DISCONTINUED | OUTPATIENT
Start: 2020-11-26 | End: 2020-11-30

## 2020-11-26 RX ORDER — LEVETIRACETAM 500 MG/1
500 TABLET ORAL 2 TIMES DAILY
Status: DISCONTINUED | OUTPATIENT
Start: 2020-11-26 | End: 2020-11-30

## 2020-11-26 RX ORDER — DOCUSATE SODIUM 100 MG/1
100 CAPSULE, LIQUID FILLED ORAL 2 TIMES DAILY
Status: DISCONTINUED | OUTPATIENT
Start: 2020-11-26 | End: 2020-11-30

## 2020-11-26 RX ORDER — SODIUM CHLORIDE 0.9 % (FLUSH) 0.9 %
3 SYRINGE (ML) INJECTION AS NEEDED
Status: DISCONTINUED | OUTPATIENT
Start: 2020-11-26 | End: 2020-11-30

## 2020-11-26 RX ORDER — METOPROLOL SUCCINATE 100 MG/1
100 TABLET, EXTENDED RELEASE ORAL DAILY
Status: DISCONTINUED | OUTPATIENT
Start: 2020-11-26 | End: 2020-11-30

## 2020-11-26 RX ORDER — HEPARIN SODIUM AND DEXTROSE 10000; 5 [USP'U]/100ML; G/100ML
12 INJECTION INTRAVENOUS ONCE
Status: COMPLETED | OUTPATIENT
Start: 2020-11-26 | End: 2020-11-26

## 2020-11-26 RX ORDER — HEPARIN SODIUM 1000 [USP'U]/ML
60 INJECTION, SOLUTION INTRAVENOUS; SUBCUTANEOUS ONCE
Status: DISCONTINUED | OUTPATIENT
Start: 2020-11-26 | End: 2020-11-26

## 2020-11-26 RX ORDER — FUROSEMIDE 40 MG/1
40 TABLET ORAL 2 TIMES DAILY
COMMUNITY

## 2020-11-26 RX ORDER — HEPARIN SODIUM 1000 [USP'U]/ML
60 INJECTION, SOLUTION INTRAVENOUS; SUBCUTANEOUS ONCE
Status: COMPLETED | OUTPATIENT
Start: 2020-11-26 | End: 2020-11-26

## 2020-11-26 RX ORDER — LISINOPRIL 5 MG/1
5 TABLET ORAL DAILY
Status: DISCONTINUED | OUTPATIENT
Start: 2020-11-27 | End: 2020-11-30

## 2020-11-26 RX ORDER — FUROSEMIDE 10 MG/ML
20 INJECTION INTRAMUSCULAR; INTRAVENOUS ONCE
Status: COMPLETED | OUTPATIENT
Start: 2020-11-26 | End: 2020-11-26

## 2020-11-26 RX ORDER — HEPARIN SODIUM AND DEXTROSE 10000; 5 [USP'U]/100ML; G/100ML
INJECTION INTRAVENOUS CONTINUOUS
Status: DISCONTINUED | OUTPATIENT
Start: 2020-11-26 | End: 2020-11-30

## 2020-11-26 RX ORDER — BISACODYL 10 MG
10 SUPPOSITORY, RECTAL RECTAL
Status: DISCONTINUED | OUTPATIENT
Start: 2020-11-26 | End: 2020-11-30

## 2020-11-26 RX ORDER — HEPARIN SODIUM AND DEXTROSE 10000; 5 [USP'U]/100ML; G/100ML
12 INJECTION INTRAVENOUS ONCE
Status: DISCONTINUED | OUTPATIENT
Start: 2020-11-26 | End: 2020-11-26

## 2020-11-26 RX ORDER — POLYETHYLENE GLYCOL 3350 17 G/17G
17 POWDER, FOR SOLUTION ORAL DAILY PRN
Status: DISCONTINUED | OUTPATIENT
Start: 2020-11-26 | End: 2020-11-30

## 2020-11-26 NOTE — H&P
[facility] Hospitalist H&P       CC: Patient presents with:  Chest Pain Angina       PCP: None Pcp    History of Present Illness: 37 y/o m w hx of afib, cva, seizure, mitral valve replacement p/w left rib pain, last dc/ed 9/20 for cp, left ama, states today angelica Non distended   Extremities: Extremities normal, atraumatic, no cyanosis or edema. Skin: Skin color, texture, turgor normal. No rashes or lesions.     Neurologic: Normal strength, no focal deficit appreciated     Diagnostic Data:    CBC/Chem  Recent Labs neg    MVR mechanical 6/13: inr 1.25, heparin drip for bridging    Chf acute on chronic sysolic:   -lasix x1    Hx of congenital heart disease s/p surgery age 11 per jason notes    Seizures: cont meds    Htn: ace/bb    FN:  - IVF:none  - Diet:regular    DV

## 2020-11-26 NOTE — ED INITIAL ASSESSMENT (HPI)
Pt presents to ED via EMS for left sided/mid sternal chest pain \"for two minutes\" while waiting for bus to go to work. +SOB. Hx mitral valve replacement and afib.  Pt is alert, oriented and verbal. Denies cough or fever

## 2020-11-26 NOTE — CONSULTS
[facility]    PATIENT'S NAME: FERNY Mayer   ATTENDING PHYSICIAN: Verenice Gomez. Torrie Mead MD   CONSULTING PHYSICIAN: Rosalba Bourne MD   PATIENT ACCOUNT#:   [id number]    LOCATION:  [address] [place] [address] #:   [id number]       DATE [facility] breath. There is chest discomfort as described above. There is no palpitations. There is no abdominal pain, melena, hematuria. No new allergies. No new rashes. There is no history of diabetes. There is no limiting arthritis.       PHYSICAL EXAMINATIO controlled. 9.   Hypercholesterolemia, for which he is supposed to be on statin. PLAN:    1. EKG reviewed. 2.   Discussed with Dr. Steve Mcgrath. 3.   Heparin drip with bridging to Coumadin.   4.   Resume blood pressure medications and medications for his

## 2020-11-26 NOTE — ED PROVIDER NOTES
Patient Seen in: [facility] Emergency Department      History   Patient presents with:  Chest Pain Angina    Stated Complaint: cp    HPI    Patient presents to the emergency department complaining of chest pain.   He describes midsternal left-sided Rhythm: Normal rate. Rhythm irregularly irregular. Pulses: Normal pulses. Heart sounds: No murmur. Pulmonary:      Effort: Pulmonary effort is normal. No respiratory distress. Breath sounds: Normal breath sounds.    Abdominal:      General: DIFFERENTIAL[[id number]]          Abnormal            Final result                 Please view results for these tests on the individual orders.    RAINBOW DRAW BLUE   RAINBOW DRAW LAVENDER   RAINBOW DRAW LIGHT GREEN   RAINBOW DRAW GOLD     EKG    Rate, inte Heparin and observation.                       Disposition and Plan     Clinical Impression:  Chest pain of uncertain etiology  (primary encounter diagnosis)  Dyspnea, unspecified type  Atrial fibrillation and flutter ([address]  Status post aortic valve and myra

## 2020-11-26 NOTE — ED NOTES
Orders for admission, patient is aware of plan and ready to go upstairs.  Any questions, please call ED ELIAS garcia  at extension [phone]  Type of COVID test sent:  COVID Suspicion level: Low    Titratable drug(s) infusing:heparin   Rate:8 ml/hr    LOC at time of

## 2020-11-27 PROCEDURE — 85027 COMPLETE CBC AUTOMATED: CPT | Performed by: HOSPITALIST

## 2020-11-27 PROCEDURE — 85730 THROMBOPLASTIN TIME PARTIAL: CPT | Performed by: HOSPITALIST

## 2020-11-27 PROCEDURE — 84132 ASSAY OF SERUM POTASSIUM: CPT | Performed by: HOSPITALIST

## 2020-11-27 PROCEDURE — 80053 COMPREHEN METABOLIC PANEL: CPT | Performed by: HOSPITALIST

## 2020-11-27 PROCEDURE — 85610 PROTHROMBIN TIME: CPT | Performed by: HOSPITALIST

## 2020-11-27 RX ORDER — POTASSIUM CHLORIDE 20 MEQ/1
40 TABLET, EXTENDED RELEASE ORAL EVERY 4 HOURS
Status: COMPLETED | OUTPATIENT
Start: 2020-11-27 | End: 2020-11-27

## 2020-11-27 NOTE — PLAN OF CARE
Problem: CARDIOVASCULAR - ADULT  Goal: Maintains optimal cardiac output and hemodynamic stability  Description: INTERVENTIONS:  - Monitor vital signs, rhythm, and trends  - Monitor for bleeding, hypotension and signs of decreased cardiac output  - Evaluate

## 2020-11-27 NOTE — PROGRESS NOTES
[facility]  Progress Note    Rocio Price Patient Status:  Observation    10/13/1980 MRN [id number]   Location [facility] 3W/SW Attending Diana Maya MD   Hosp Day # 0 PCP None Pcp     Impression:  ASSESSMENT AND PLAN: Summary (Last 24 hours) at 11/27/2020 0903  Last data filed at 11/27/2020 0700  Gross per 24 hour   Intake 1334.6 ml   Output —   Net 1334.6 ml     Wt Readings from Last 3 Encounters:  11/27/20 : 138 lb 1.6 oz (62.6 kg)  09/28/20 : 138 lb 12.8 oz (63 kg) 11/27/2020    CO2 31.0 11/27/2020    BUN 12 11/27/2020    CREATSERUM 0.83 11/27/2020    GLU 74 11/27/2020    CA 8.6 11/27/2020     Recent Labs   Lab 11/26/20  0943 11/26/20  1337   TROP <0.045 <0.045       ECHO done at [facility] in [facility]

## 2020-11-27 NOTE — PROGRESS NOTES
[facility] Hospitalist Progress Note     CC: Hospital Follow up    PCP: None Pcp       Assessment/Plan:     Principal Problem:    Chest pain of uncertain etiology  Active Problems:    Dyspnea, unspecified type    Atrial fibrillation and flutter (HCC)    Status po or wheezes  CV: RRR, no murmurs, 2+ peripheral pulses  ABD: Soft, non-tender, non-distended, +BS  MSK: strength 5/5 in all extremities  Neuro: Grossly normal, CN intact, sensory intact  Psych: Affect- normal  SKIN: warm, dry  EXT: no edema      Data Review Potassium Chloride ER  40 mEq Oral Q4H   • levETIRAcetam  500 mg Oral BID   • lisinopril  5 mg Oral Daily   • Metoprolol Succinate ER  100 mg Oral Daily   • Warfarin Sodium  12 mg Oral Nightly   • docusate sodium  100 mg Oral BID     • Continuous dose Hepa

## 2020-11-28 PROCEDURE — 85730 THROMBOPLASTIN TIME PARTIAL: CPT | Performed by: HOSPITALIST

## 2020-11-28 PROCEDURE — 85610 PROTHROMBIN TIME: CPT | Performed by: HOSPITALIST

## 2020-11-28 NOTE — PROGRESS NOTES
[facility] Hospitalist Progress Note     CC: Hospital Follow up    PCP: None Pcp       Assessment/Plan:     Principal Problem:    Chest pain of uncertain etiology  Active Problems:    Dyspnea, unspecified type    Atrial fibrillation and flutter (HCC)    Status po CTAB, no crackles or wheezes  CV: RRR, no murmurs, 2+ peripheral pulses  ABD: Soft, non-tender, non-distended, +BS  MSK: strength 5/5 in all extremities  Neuro: Grossly normal, CN intact, sensory intact  Psych: Affect- normal  SKIN: warm, dry  EXT: no bud (CST): Margaret García MD on 11/26/2020 at 11:05 AM              Meds:     • levETIRAcetam  500 mg Oral BID   • lisinopril  5 mg Oral Daily   • Metoprolol Succinate ER  100 mg Oral Daily   • Warfarin Sodium  12 mg Oral Nightly   • docusate sodium  100 mg Oral

## 2020-11-28 NOTE — PLAN OF CARE
Problem: Patient Centered Care  Goal: Patient preferences are identified and integrated in the patient's plan of care  Description: Interventions:  - What would you like us to know as we care for you?  I want to be D/C 4600 in AM  - Provide timely, comple

## 2020-11-28 NOTE — PROGRESS NOTES
[facility]  Progress Note    Citlaly Baltimore Patient Status:  Observation    10/13/1980 MRN [id number]   Location [facility] 3W/SW Attending Chayito Barclay MD   Hosp Day # 0 PCP None Pcp     Impression:  ASSESSMENT AND PLAN: 100%   BMI 22.98 kg/m²   Temp (24hrs), Av.1 °F (36.7 °C), Min:97.8 °F (36.6 °C), Max:98.4 °F (36.9 °C)      Intake/Output Summary (Last 24 hours) at 2020 1154  Last data filed at 2020 0900  Gross per 24 hour   Intake 1213.33 ml   Output 300 11/26/20  1337   TROP <0.045 <0.045     Recent Labs   Lab 11/26/20  0943 11/27/20  0721 11/27/20  1649   GLU 78 74  --    BUN 13 12  --    CREATSERUM 0.93 0.83  --    GFRAA 118 127  --    GFRNAA 102 110  --    CA 8.8 8.6  --     140  --    K 3.9 3.6

## 2020-11-28 NOTE — PLAN OF CARE
Patient alert and oriented. Patient states he wants to leave by 4am tomorrow. Patient educated that his INR is not yet therapeutic and the risks to leaving early.  Patient educated that he most likely would not be discharged tomorrow since the INR was 1.29 ordered  Outcome: Progressing

## 2020-11-28 NOTE — PLAN OF CARE
Patient alert and oriented. Heparin gtt infusing and therapeutic. Plan is to bridge to coumadin.       Problem: Patient Centered Care  Goal: Patient preferences are identified and integrated in the patient's plan of care  Description: Interventions:  - What

## 2020-11-29 PROCEDURE — 85730 THROMBOPLASTIN TIME PARTIAL: CPT | Performed by: HOSPITALIST

## 2020-11-29 PROCEDURE — 85610 PROTHROMBIN TIME: CPT | Performed by: HOSPITALIST

## 2020-11-29 NOTE — PLAN OF CARE
Problem: Patient Centered Care  Goal: Patient preferences are identified and integrated in the patient's plan of care  Description: Interventions:  - What would you like us to know as we care for you?  I want to be D/C 5470 in AM  - Provide timely, comple

## 2020-11-29 NOTE — PLAN OF CARE
Patient alert and oriented. Heparin gtt infusing. INR increased to 1.5 today. Patient plans to discharge AMA tomorrow.      Problem: Patient Centered Care  Goal: Patient preferences are identified and integrated in the patient's plan of care  Description: I

## 2020-11-29 NOTE — PROGRESS NOTES
[facility] Hospitalist Progress Note     CC: Hospital Follow up    PCP: None Pcp       Assessment/Plan:     Principal Problem:    Chest pain of uncertain etiology  Active Problems:    Dyspnea, unspecified type    Atrial fibrillation and flutter (HCC)    Status po Supple, no JVD  Pulm: CTAB, no crackles or wheezes  CV: RRR, no murmurs, 2+ peripheral pulses  ABD: Soft, non-tender, non-distended, +BS  MSK: strength 5/5 in all extremities  Neuro: Grossly normal, CN intact, sensory intact  Psych: Affect- normal  SKIN: w

## 2020-11-29 NOTE — PROGRESS NOTES
[facility]  Progress Note    Mode Mendoza Patient Status:  Observation    10/13/1980 MRN [id number]   Location [facility] 3W/SW Attending Jason Reid MD   Hosp Day # 0 PCP None Pcp     Impression:  ASSESSMENT AND PLAN: SpO2 100%   BMI 22.58 kg/m²   Temp (24hrs), Av °F (36.7 °C), Min:97.8 °F (36.6 °C), Max:98.1 °F (36.7 °C)      Intake/Output Summary (Last 24 hours) at 2020 1116  Last data filed at 2020 0917  Gross per 24 hour   Intake 777.4 ml   Output — Recent Labs   Lab 11/26/20  0943 11/27/20  0721 11/27/20  1649   GLU 78 74  --    BUN 13 12  --    CREATSERUM 0.93 0.83  --    GFRAA 118 127  --    GFRNAA 102 110  --    CA 8.8 8.6  --     140  --    K 3.9 3.6 4.1    107  --    CO2 27.0 31.

## 2020-11-30 VITALS
RESPIRATION RATE: 20 BRPM | SYSTOLIC BLOOD PRESSURE: 118 MMHG | WEIGHT: 133.81 LBS | HEART RATE: 81 BPM | TEMPERATURE: 98 F | OXYGEN SATURATION: 99 % | BODY MASS INDEX: 22 KG/M2 | DIASTOLIC BLOOD PRESSURE: 55 MMHG

## 2020-11-30 PROCEDURE — 85730 THROMBOPLASTIN TIME PARTIAL: CPT | Performed by: HOSPITALIST

## 2020-11-30 PROCEDURE — 85610 PROTHROMBIN TIME: CPT | Performed by: HOSPITALIST

## 2020-11-30 RX ORDER — WARFARIN SODIUM 6 MG/1
12 TABLET ORAL DAILY
Qty: 60 TABLET | Refills: 0 | Status: SHIPPED | OUTPATIENT
Start: 2020-11-30 | End: 2020-12-30

## 2020-11-30 NOTE — PROGRESS NOTES
[facility]  Progress Note    Lanis Rank Patient Status:  Observation    10/13/1980 MRN [id number]   Location [facility] 3W/SW Attending Nancy Carballo MD   Hosp Day # 0 PCP None Pcp     Impression:  ASSESSMENT AND PLAN: Intake 717.6 ml   Output —   Net 717.6 ml     Wt Readings from Last 3 Encounters:  11/30/20 : 133 lb 12.8 oz (60.7 kg)  09/28/20 : 138 lb 12.8 oz (63 kg)  09/26/20 : 135 lb (61.2 kg)          General: Awake and alert; in no acute distress  Neck: Supple, 31.0  --          ECHO done at [facility] in October 2020 which showed mild systolic dysfunction and enlarged right ventricle.           Nuclear stress test done at [facility] in October 2020 showed perfusion abnormalities with larg

## 2020-11-30 NOTE — PLAN OF CARE
Reviewed discharge instructions with patient. Discussed medications and side effects. Stated the importance of following up with his cardiac doctor in a week and have labs redrawn.       Problem: Patient Centered Care  Goal: Patient preferences are identifi

## 2020-11-30 NOTE — PLAN OF CARE
Heparin gtt infusing @ 8 ml/hr - bridging to coumadin. PT/INR and PTT in AM. No c/o pain. VSS. Expressive aphasia, no chest pain. Plan to d/c to home.      Problem: Patient Centered Care  Goal: Patient preferences are identified and integrated in the patien

## 2020-11-30 NOTE — DISCHARGE SUMMARY
General Medicine Discharge Summary     Patient ID:  Ashley Mann  36year old  10/13/1980    Admit date: 11/26/2020    Discharge date and time: 11/30/20    Attending Physician: Krista Chin MD     Consults: IP CONSULT TO HOSPITALIST    Primary Care Jose agree with therapeutic plan as outlined    Thank Remington Vallejo MD    [facility] Hospitalist  Pager

## 2021-07-07 NOTE — PLAN OF CARE
This morning patient is comfortable, denies SOB/CP, and wants to go home. Patient is very adamant on going home today and wants to leave AMA due to having work tomorrow.  Patient was willing to go for his ECHO this morning however states he wants to be out trends  - Monitor for bleeding, hypotension and signs of decreased cardiac output  - Evaluate effectiveness of vasoactive medications to optimize hemodynamic stability  - Monitor arterial and/or venous puncture sites for bleeding and/or hematoma  - Assess physical limitations  - Instruct pt to call for assistance with activity based on assessment  - Modify environment to reduce risk of injury  - Provide assistive devices as appropriate  - Consider OT/PT consult to assist with strengthening/mobility  - Encou none

## 2022-11-26 ENCOUNTER — HOSPITAL ENCOUNTER (INPATIENT)
Dept: HOSPITAL 87 - ER | Age: 42
LOS: 3 days | Discharge: HOME | DRG: 203 | End: 2022-11-29
Attending: INTERNAL MEDICINE | Admitting: INTERNAL MEDICINE
Payer: MEDICAID

## 2022-11-26 VITALS — BODY MASS INDEX: 23.63 KG/M2 | WEIGHT: 147 LBS | HEIGHT: 66 IN

## 2022-11-26 VITALS — DIASTOLIC BLOOD PRESSURE: 68 MMHG | SYSTOLIC BLOOD PRESSURE: 113 MMHG

## 2022-11-26 VITALS — SYSTOLIC BLOOD PRESSURE: 127 MMHG | DIASTOLIC BLOOD PRESSURE: 71 MMHG

## 2022-11-26 DIAGNOSIS — Z95.3: ICD-10-CM

## 2022-11-26 DIAGNOSIS — Z86.73: ICD-10-CM

## 2022-11-26 DIAGNOSIS — Z79.01: ICD-10-CM

## 2022-11-26 DIAGNOSIS — D68.69: ICD-10-CM

## 2022-11-26 DIAGNOSIS — M94.0: Primary | ICD-10-CM

## 2022-11-26 DIAGNOSIS — R65.10: ICD-10-CM

## 2022-11-26 DIAGNOSIS — I25.2: ICD-10-CM

## 2022-11-26 DIAGNOSIS — I31.39: ICD-10-CM

## 2022-11-26 DIAGNOSIS — I11.0: ICD-10-CM

## 2022-11-26 DIAGNOSIS — I34.0: ICD-10-CM

## 2022-11-26 DIAGNOSIS — I48.20: ICD-10-CM

## 2022-11-26 DIAGNOSIS — I50.22: ICD-10-CM

## 2022-11-26 LAB
BASOPHILS NFR BLD AUTO: 0.6 % (ref 0–2)
CHLORIDE SERPL-SCNC: 107 MEQ/L (ref 98–107)
EOSINOPHIL NFR BLD AUTO: 3.1 % (ref 0–5)
ERYTHROCYTE [DISTWIDTH] IN BLOOD BY AUTOMATED COUNT: 13.8 % (ref 11.6–14.6)
HCT VFR BLD AUTO: 36.8 % (ref 42–52)
HGB BLD-MCNC: 12 G/DL (ref 14–18)
INR PPP: 1.8
LYMPHOCYTES NFR BLD AUTO: 27.4 % (ref 20–50)
MCH RBC QN AUTO: 32.2 PG (ref 28–32)
MCV RBC AUTO: 98.3 FL (ref 80–94)
MONOCYTES NFR BLD AUTO: 10.2 % (ref 2–8)
NEUTROPHILS NFR BLD AUTO: 58.7 % (ref 40–76)
PLATELET # BLD AUTO: 133 X1000/UL (ref 130–400)
PMV BLD AUTO: 10.7 FL (ref 7.4–10.4)
PROTHROMBIN TIME: 18.2 SEC (ref 9.6–11)
RBC # BLD AUTO: 3.75 MILL/UL (ref 4.7–6.1)

## 2022-11-26 PROCEDURE — 36415 COLL VENOUS BLD VENIPUNCTURE: CPT

## 2022-11-26 PROCEDURE — 99291 CRITICAL CARE FIRST HOUR: CPT

## 2022-11-26 PROCEDURE — 71045 X-RAY EXAM CHEST 1 VIEW: CPT

## 2022-11-26 PROCEDURE — 93306 TTE W/DOPPLER COMPLETE: CPT

## 2022-11-26 PROCEDURE — 83880 ASSAY OF NATRIURETIC PEPTIDE: CPT

## 2022-11-26 PROCEDURE — 84484 ASSAY OF TROPONIN QUANT: CPT

## 2022-11-26 PROCEDURE — 80048 BASIC METABOLIC PNL TOTAL CA: CPT

## 2022-11-26 PROCEDURE — 93970 EXTREMITY STUDY: CPT

## 2022-11-26 PROCEDURE — 80053 COMPREHEN METABOLIC PANEL: CPT

## 2022-11-26 PROCEDURE — 93005 ELECTROCARDIOGRAM TRACING: CPT

## 2022-11-26 PROCEDURE — 85025 COMPLETE CBC W/AUTO DIFF WBC: CPT

## 2022-11-26 PROCEDURE — 85379 FIBRIN DEGRADATION QUANT: CPT

## 2022-11-26 RX ADMIN — Medication SCH ML: at 20:30

## 2022-11-27 VITALS — DIASTOLIC BLOOD PRESSURE: 69 MMHG | SYSTOLIC BLOOD PRESSURE: 135 MMHG

## 2022-11-27 VITALS — DIASTOLIC BLOOD PRESSURE: 65 MMHG | SYSTOLIC BLOOD PRESSURE: 122 MMHG

## 2022-11-27 VITALS — SYSTOLIC BLOOD PRESSURE: 121 MMHG | DIASTOLIC BLOOD PRESSURE: 64 MMHG

## 2022-11-27 VITALS — SYSTOLIC BLOOD PRESSURE: 106 MMHG | DIASTOLIC BLOOD PRESSURE: 64 MMHG

## 2022-11-27 VITALS — SYSTOLIC BLOOD PRESSURE: 132 MMHG | DIASTOLIC BLOOD PRESSURE: 51 MMHG

## 2022-11-27 VITALS — DIASTOLIC BLOOD PRESSURE: 62 MMHG | SYSTOLIC BLOOD PRESSURE: 128 MMHG

## 2022-11-27 LAB
INR PPP: 1.8
PROTHROMBIN TIME: 18.4 SEC (ref 9.6–11)

## 2022-11-27 RX ADMIN — Medication SCH ML: at 21:49

## 2022-11-27 RX ADMIN — Medication SCH ML: at 04:41

## 2022-11-27 RX ADMIN — Medication SCH ML: at 14:21

## 2022-11-28 VITALS — DIASTOLIC BLOOD PRESSURE: 63 MMHG | SYSTOLIC BLOOD PRESSURE: 110 MMHG

## 2022-11-28 VITALS — DIASTOLIC BLOOD PRESSURE: 66 MMHG | SYSTOLIC BLOOD PRESSURE: 113 MMHG

## 2022-11-28 VITALS — DIASTOLIC BLOOD PRESSURE: 65 MMHG | SYSTOLIC BLOOD PRESSURE: 107 MMHG

## 2022-11-28 VITALS — DIASTOLIC BLOOD PRESSURE: 57 MMHG | SYSTOLIC BLOOD PRESSURE: 114 MMHG

## 2022-11-28 VITALS — DIASTOLIC BLOOD PRESSURE: 61 MMHG | SYSTOLIC BLOOD PRESSURE: 112 MMHG

## 2022-11-28 VITALS — DIASTOLIC BLOOD PRESSURE: 85 MMHG | SYSTOLIC BLOOD PRESSURE: 136 MMHG

## 2022-11-28 LAB
BASOPHILS NFR BLD AUTO: 0.9 % (ref 0–2)
CHLORIDE SERPL-SCNC: 105 MEQ/L (ref 98–107)
D DIMER PPP FEU-MCNC: 0.34 MG/L FEU (ref ?–0.5)
EOSINOPHIL NFR BLD AUTO: 4.9 % (ref 0–5)
ERYTHROCYTE [DISTWIDTH] IN BLOOD BY AUTOMATED COUNT: 13.7 % (ref 11.6–14.6)
HCT VFR BLD AUTO: 37.3 % (ref 42–52)
HGB BLD-MCNC: 12.4 G/DL (ref 14–18)
INR PPP: 1.9
LYMPHOCYTES NFR BLD AUTO: 36.7 % (ref 20–50)
MCH RBC QN AUTO: 32.1 PG (ref 28–32)
MCV RBC AUTO: 96.6 FL (ref 80–94)
MONOCYTES NFR BLD AUTO: 13.7 % (ref 2–8)
NEUTROPHILS NFR BLD AUTO: 43.8 % (ref 40–76)
PLATELET # BLD AUTO: 138 X1000/UL (ref 130–400)
PMV BLD AUTO: 11.1 FL (ref 7.4–10.4)
PROTHROMBIN TIME: 19.7 SEC (ref 9.6–11)
RBC # BLD AUTO: 3.86 MILL/UL (ref 4.7–6.1)

## 2022-11-28 RX ADMIN — Medication SCH ML: at 14:48

## 2022-11-28 RX ADMIN — FUROSEMIDE SCH MG: 10 INJECTION, SOLUTION INTRAMUSCULAR; INTRAVENOUS at 09:51

## 2022-11-28 RX ADMIN — Medication SCH ML: at 05:36

## 2022-11-28 RX ADMIN — FUROSEMIDE SCH MG: 10 INJECTION, SOLUTION INTRAMUSCULAR; INTRAVENOUS at 17:00

## 2022-11-28 RX ADMIN — Medication SCH ML: at 20:29

## 2022-11-28 RX ADMIN — CARVEDILOL SCH MG: 3.12 TABLET, FILM COATED ORAL at 09:51

## 2022-11-28 RX ADMIN — CARVEDILOL SCH MG: 3.12 TABLET, FILM COATED ORAL at 20:25

## 2022-11-29 VITALS — SYSTOLIC BLOOD PRESSURE: 108 MMHG | DIASTOLIC BLOOD PRESSURE: 70 MMHG

## 2022-11-29 VITALS — DIASTOLIC BLOOD PRESSURE: 74 MMHG | SYSTOLIC BLOOD PRESSURE: 113 MMHG

## 2022-11-29 VITALS — SYSTOLIC BLOOD PRESSURE: 111 MMHG | DIASTOLIC BLOOD PRESSURE: 72 MMHG

## 2022-11-29 VITALS — SYSTOLIC BLOOD PRESSURE: 109 MMHG | DIASTOLIC BLOOD PRESSURE: 70 MMHG

## 2022-11-29 VITALS — SYSTOLIC BLOOD PRESSURE: 113 MMHG | DIASTOLIC BLOOD PRESSURE: 69 MMHG

## 2022-11-29 VITALS — DIASTOLIC BLOOD PRESSURE: 53 MMHG | SYSTOLIC BLOOD PRESSURE: 110 MMHG

## 2022-11-29 LAB
BASOPHILS NFR BLD AUTO: 0.6 % (ref 0–2)
CHLORIDE SERPL-SCNC: 102 MEQ/L (ref 98–107)
EOSINOPHIL NFR BLD AUTO: 3.5 % (ref 0–5)
ERYTHROCYTE [DISTWIDTH] IN BLOOD BY AUTOMATED COUNT: 13.5 % (ref 11.6–14.6)
HCT VFR BLD AUTO: 39.6 % (ref 42–52)
HGB BLD-MCNC: 13.5 G/DL (ref 14–18)
INR PPP: 1.8
LYMPHOCYTES NFR BLD AUTO: 34.7 % (ref 20–50)
MCH RBC QN AUTO: 32.7 PG (ref 28–32)
MCV RBC AUTO: 96 FL (ref 80–94)
MONOCYTES NFR BLD AUTO: 14.7 % (ref 2–8)
NEUTROPHILS NFR BLD AUTO: 46.5 % (ref 40–76)
PLATELET # BLD AUTO: 149 X1000/UL (ref 130–400)
PMV BLD AUTO: 11.6 FL (ref 7.4–10.4)
PROTHROMBIN TIME: 18.3 SEC (ref 9.6–11)
RBC # BLD AUTO: 4.13 MILL/UL (ref 4.7–6.1)

## 2022-11-29 RX ADMIN — Medication SCH ML: at 05:06

## 2022-11-29 RX ADMIN — Medication SCH ML: at 14:35

## 2022-11-29 RX ADMIN — FUROSEMIDE SCH MG: 10 INJECTION, SOLUTION INTRAMUSCULAR; INTRAVENOUS at 09:11

## 2022-11-29 RX ADMIN — CARVEDILOL SCH MG: 3.12 TABLET, FILM COATED ORAL at 09:12

## 2023-04-03 ENCOUNTER — HOSPITAL ENCOUNTER (EMERGENCY)
Dept: HOSPITAL 87 - ER | Age: 43
Discharge: HOME | End: 2023-04-03
Payer: MEDICAID

## 2023-04-03 VITALS — WEIGHT: 143.3 LBS | BODY MASS INDEX: 22.49 KG/M2 | HEIGHT: 67 IN

## 2023-04-03 VITALS — DIASTOLIC BLOOD PRESSURE: 74 MMHG | SYSTOLIC BLOOD PRESSURE: 134 MMHG

## 2023-04-03 DIAGNOSIS — R10.12: Primary | ICD-10-CM

## 2023-04-03 DIAGNOSIS — Z86.73: ICD-10-CM

## 2023-04-03 DIAGNOSIS — I48.91: ICD-10-CM

## 2023-04-03 LAB
BASOPHILS NFR BLD AUTO: 0.6 % (ref 0–2)
CHLORIDE SERPL-SCNC: 109 MEQ/L (ref 98–107)
EOSINOPHIL NFR BLD AUTO: 1.1 % (ref 0–5)
ERYTHROCYTE [DISTWIDTH] IN BLOOD BY AUTOMATED COUNT: 14.3 % (ref 11.6–14.6)
HCT VFR BLD AUTO: 35.8 % (ref 42–52)
HGB BLD-MCNC: 11.8 G/DL (ref 14–18)
LYMPHOCYTES NFR BLD AUTO: 23.6 % (ref 20–50)
MCH RBC QN AUTO: 32.1 PG (ref 28–32)
MCV RBC AUTO: 97.5 FL (ref 80–94)
MONOCYTES NFR BLD AUTO: 11.1 % (ref 2–8)
NEUTROPHILS NFR BLD AUTO: 63.6 % (ref 40–76)
PLATELET # BLD AUTO: 163 X1000/UL (ref 130–400)
PMV BLD AUTO: 10.1 FL (ref 7.4–10.4)
RBC # BLD AUTO: 3.68 MILL/UL (ref 4.7–6.1)

## 2023-04-03 PROCEDURE — 83605 ASSAY OF LACTIC ACID: CPT

## 2023-04-03 PROCEDURE — 93005 ELECTROCARDIOGRAM TRACING: CPT

## 2023-04-03 PROCEDURE — 99285 EMERGENCY DEPT VISIT HI MDM: CPT

## 2023-04-03 PROCEDURE — 36415 COLL VENOUS BLD VENIPUNCTURE: CPT

## 2023-04-03 PROCEDURE — 74176 CT ABD & PELVIS W/O CONTRAST: CPT

## 2023-04-03 PROCEDURE — 84484 ASSAY OF TROPONIN QUANT: CPT

## 2023-04-03 PROCEDURE — 83690 ASSAY OF LIPASE: CPT

## 2023-04-03 PROCEDURE — 85025 COMPLETE CBC W/AUTO DIFF WBC: CPT

## 2023-04-03 PROCEDURE — 80053 COMPREHEN METABOLIC PANEL: CPT

## (undated) NOTE — IP AVS SNAPSHOT
[facility]            (For Outpatient Use Only) Initial Admit Date: 11/26/2020   Inpt/Obs Admit Date: Inpt: N/A / Obs: 11/26/20   Discharge Date: 11/30/2020   Hospital Acct:  [de-identified]   MRN: [de-identified]   CSN: [id number]   CEID: [id number] Subscriber ID:  Pt Rel to Subscriber:    TERTIARY INSURANCE   Payor:  Plan:    Group Number:  Insurance Type:    Subscriber Name:  Subscriber :    Subscriber ID:  Pt Rel to Subscriber:    Hospital Account Financial Class: Medicaid Advantage    November

## (undated) NOTE — ED AVS SNAPSHOT
Jimi Reyna   MRN: [id number]    Department:  [facility] Emergency Department   Date of Visit:  11/23/2019           Disclosure     Insurance plans vary and the physician(s) referred by the ER may not be covered by your plan.  Please contac CARE PHYSICIAN AT ONCE OR RETURN IMMEDIATELY TO THE EMERGENCY DEPARTMENT. If you have been prescribed any medication(s), please fill your prescription right away and begin taking the medication(s) as directed.   If you believe that any of the medications

## (undated) NOTE — IP AVS SNAPSHOT
Patient Demographics     Address  [address] [place] [address],[address] [address] Phone  [phone] [facility])  [phone] (Mobile) *Preferred*      Emergency Contact(s)     Name Relation Home Work Mobile    TRISTONGUILLAUME Relative [phone] [id number] Metoprolol Succinate ER (Toprol XL) 24 hr tab 100 mg 11/30/20 0821 Given      [id number] Warfarin Sodium (COUMADIN) tab 15 mg 11/29/20 2201 Given      [id number] docusate sodium (COLACE) cap 100 mg 11/29/20 2201 Given      [id number] docusate sodiu Type Source Collected On   Blood — 11/30/20 0822          Components    Component Value Reference Range Flag Lab   PTT 94.8 23.2 - 35.3 seconds [facility])   Comment:        Elevations of the aPTT in patients not receiving  anticoagulant therapy ( Rapid SARS-CoV-2 by PCR Not Detected         H&P - H&P Note      H&P signed by Manjeet Rowell MD at 11/26/2020  1:03 PM  Version 2 of 2    Author: Manjeet Rowell MD Service: — Author Type: Physician    Filed: 11/26/2020  1:03 PM Date of Service: 11/26/2020 1 Throat: Lips, mucosa, and tongue normal. Teeth and gums normal.   Neck: Supple, symmetrical, trachea midline, no cervical or supraclavicular lymph adenopathy, thyroid: no enlargment/tenderness/nodules appreciated   Lungs:   Clear to auscultation bilaterall CONCLUSION:   No pulmonary embolus. Severe right atrial and right ventricular enlargement with imaging findings suggestive of right heart dysfunction. Dilated main pulmonary artery can be seen with pulmonary hypertension.   Mosaic attenuation of the lungs H&P signed by Ashley Wilson MD at 11/26/2020 12:42 PM  Version 1 of 2    Author: Ashley Wilson MD Service: [facility] Author Type: Physician    Filed: 11/26/2020 12:42 PM Date of Service: 11/26/2020 12:31 PM Status: Signed    : Ashley Wilson MD (Physician)    Re Chest wall:  No tenderness or deformity. Heart:  Regular rate and rhythm, S1, S2 normal, no murmur, rub or gallop appreciated   Abdomen:   Soft, non-tender. Bowel sounds normal. No masses,  No organomegaly.  Non distended   Extremities: Extremities normal Author: Jocelyne Moreno MD Service: Cardiology Author Type: Physician    Filed: 11/27/2020  7:19 AM Status: Signed    : Jocelyne Moreno MD (Physician)       [facility]    PATIENT'S NAME: FERNY Arteaga   ATTENDING PHYSICIAN: Juan M Buck FAMILY HISTORY:  No history of premature coronary disease. REVIEW OF SYSTEMS:  No fevers or chills. No headaches. There is history of stroke and seizure. There is no sore throat. There is shortness of breath.   There is chest discomfort as described 4.   Atrial fibrillation, for which he is noncompliant taking warfarin. 5.   History of stroke. 6.   History of seizure disorder.   7.   History of abnormal stress test in the past, for which he has refused coronary angiogram.  8.   Hypertension, controll : Natalee Alfaro MD (Physician)       General Medicine Discharge Summary     Patient ID:  Linh Abbott  36year old  10/13/1980    Admit date: 11/26/2020    Discharge date and time: 11/30/20    Attending Physician: Natalee Alfaro MD     Consults: IP Patient had opportunity to ask questions and state understand and agree with therapeutic plan as outlined    Thank You,    Jami Tabares MD    [facility] Hospitalist  Pager[IC.1]       Electronically signed by Simona Fuentes MD on 11/30/2020 11:27 AM   Attribution Toby Class

## (undated) NOTE — ED AVS SNAPSHOT
Deirdre Maria   MRN: [id number]    Department:  [facility] Emergency Department   Date of Visit:  11/8/2019           Disclosure     Insurance plans vary and the physician(s) referred by the ER may not be covered by your plan.  Please contact CARE PHYSICIAN AT ONCE OR RETURN IMMEDIATELY TO THE EMERGENCY DEPARTMENT. If you have been prescribed any medication(s), please fill your prescription right away and begin taking the medication(s) as directed.   If you believe that any of the medications

## (undated) NOTE — LETTER
Date & Time: 11/23/2019, 1:39 PM  Patient: Wendy Donis  Encounter Provider(s):    Catarino Mcclendon MD       To Whom It May Concern:    Lissette Martinez was seen and treated in our department on 11/23/2019. He Cannot work more than 40 hours a week. Eber Brooks

## (undated) NOTE — ED AVS SNAPSHOT
Mode Mendoza   MRN: [id number]    Department:  [facility] Emergency Department   Date of Visit:  11/8/2019           Disclosure     Insurance plans vary and the physician(s) referred by the ER may not be covered by your plan.  Please contact CARE PHYSICIAN AT ONCE OR RETURN IMMEDIATELY TO THE EMERGENCY DEPARTMENT. If you have been prescribed any medication(s), please fill your prescription right away and begin taking the medication(s) as directed.   If you believe that any of the medications

## (undated) NOTE — ED AVS SNAPSHOT
Joslyn Read   MRN: [id number]    Department:  [facility] Emergency Department   Date of Visit:  11/16/2019           Disclosure     Insurance plans vary and the physician(s) referred by the ER may not be covered by your plan.  Please contac CARE PHYSICIAN AT ONCE OR RETURN IMMEDIATELY TO THE EMERGENCY DEPARTMENT. If you have been prescribed any medication(s), please fill your prescription right away and begin taking the medication(s) as directed.   If you believe that any of the medications